# Patient Record
Sex: MALE | Employment: OTHER | URBAN - METROPOLITAN AREA
[De-identification: names, ages, dates, MRNs, and addresses within clinical notes are randomized per-mention and may not be internally consistent; named-entity substitution may affect disease eponyms.]

---

## 2017-08-28 ENCOUNTER — FOLLOW UP (OUTPATIENT)
Dept: URBAN - METROPOLITAN AREA CLINIC 11 | Facility: CLINIC | Age: 72
End: 2017-08-28

## 2017-08-28 DIAGNOSIS — H35.413: ICD-10-CM

## 2017-08-28 PROCEDURE — 1036F TOBACCO NON-USER: CPT

## 2017-08-28 PROCEDURE — 92226 OPHTHALMOSCOPY (SUB): CPT

## 2017-08-28 PROCEDURE — G8482 FLU IMMUNIZE ORDER/ADMIN: HCPCS

## 2017-08-28 PROCEDURE — G8427 DOCREV CUR MEDS BY ELIG CLIN: HCPCS

## 2017-08-28 PROCEDURE — 4040F PNEUMOC VAC/ADMIN/RCVD: CPT

## 2017-08-28 PROCEDURE — 92014 COMPRE OPH EXAM EST PT 1/>: CPT

## 2017-08-28 ASSESSMENT — TONOMETRY
OS_IOP_MMHG: 13
OD_IOP_MMHG: 10

## 2017-08-28 ASSESSMENT — VISUAL ACUITY
OS_SC: 20/30+2
OD_SC: 20/30-2

## 2018-04-10 ENCOUNTER — OFFICE VISIT (OUTPATIENT)
Dept: CARDIOLOGY | Facility: CLINIC | Age: 73
End: 2018-04-10
Attending: INTERNAL MEDICINE
Payer: MEDICARE

## 2018-04-10 VITALS
SYSTOLIC BLOOD PRESSURE: 122 MMHG | BODY MASS INDEX: 27.87 KG/M2 | WEIGHT: 236 LBS | HEART RATE: 56 BPM | HEIGHT: 77 IN | DIASTOLIC BLOOD PRESSURE: 80 MMHG | RESPIRATION RATE: 16 BRPM

## 2018-04-10 DIAGNOSIS — I48.91 ATRIAL FIBRILLATION, UNSPECIFIED TYPE (CMS/HCC): Primary | ICD-10-CM

## 2018-04-10 DIAGNOSIS — I49.5 TACHYCARDIA-BRADYCARDIA (CMS/HCC): ICD-10-CM

## 2018-04-10 DIAGNOSIS — I42.9 CARDIOMYOPATHY, UNSPECIFIED TYPE (CMS/HCC): ICD-10-CM

## 2018-04-10 PROBLEM — I10 HYPERTENSION: Status: ACTIVE | Noted: 2018-04-10

## 2018-04-10 PROBLEM — Z82.49 FAMILY HISTORY OF CORONARY ARTERY DISEASE: Status: ACTIVE | Noted: 2018-04-10

## 2018-04-10 PROCEDURE — 93000 ELECTROCARDIOGRAM COMPLETE: CPT | Performed by: INTERNAL MEDICINE

## 2018-04-10 PROCEDURE — 99213 OFFICE O/P EST LOW 20 MIN: CPT | Performed by: INTERNAL MEDICINE

## 2018-04-10 RX ORDER — METOPROLOL SUCCINATE 100 MG/1
100 TABLET, EXTENDED RELEASE ORAL 2 TIMES DAILY
Qty: 180 TABLET | Refills: 2 | Status: SHIPPED | OUTPATIENT
Start: 2018-04-10 | End: 2018-10-09 | Stop reason: SDUPTHER

## 2018-04-10 RX ORDER — BICALUTAMIDE 50 MG/1
50 TABLET, FILM COATED ORAL DAILY
COMMUNITY
Start: 2016-08-23 | End: 2022-07-12 | Stop reason: ALTCHOICE

## 2018-04-10 RX ORDER — MULTIVITAMIN
TABLET ORAL DAILY
COMMUNITY
Start: 2016-08-23

## 2018-04-10 RX ORDER — ENALAPRIL MALEATE 10 MG/1
10 TABLET ORAL 2 TIMES DAILY
COMMUNITY
Start: 2017-10-10 | End: 2018-04-10 | Stop reason: SDUPTHER

## 2018-04-10 RX ORDER — METOPROLOL SUCCINATE 100 MG/1
100 TABLET, EXTENDED RELEASE ORAL 2 TIMES DAILY
COMMUNITY
Start: 2017-10-10 | End: 2018-04-10 | Stop reason: SDUPTHER

## 2018-04-10 RX ORDER — ENALAPRIL MALEATE 10 MG/1
10 TABLET ORAL 2 TIMES DAILY
Qty: 180 TABLET | Refills: 2 | Status: SHIPPED | OUTPATIENT
Start: 2018-04-10 | End: 2019-04-09 | Stop reason: ALTCHOICE

## 2018-04-10 NOTE — PROGRESS NOTES
April 10, 2018      Patient: Delvin Lewis   YOB: 1945   Date of Visit 4/10/2018   [facility]# [id number]   MRN# [id number]       April 10, 2018    Dear Radha,    Delvin Lewis was in our cardiology outpatient office today for his scheduled appointment.  He continues to do quite well.  He is in permanent atrial fibrillation with a mild primary cardiomyopathy, but he has not had heart failure symptoms or anything else to suggest deterioration.  We are up to date with regard to noninvasive cardiac studies and he has been able, as I said, to exercise and maintain a high level of activity.  His medicines include apixaban, Casodex, enalapril, and Toprol.    On examination, blood pressure was 122/80 with a heart rate of 56 beats per minute.  His rhythm was irregular.  Respiratory rate was 18 and he weighed 236 pounds.  His lung fields were clear.  He did not have jugular venous distention or carotid bruits.  The cardiac examination revealed an irregular rhythm with his usual soft systolic murmur at the upper left sternal edge.  He did not have organomegaly or ascites. He did not have any peripheral edema.  His peripheral pulses were full.  He had no neurologic findings.  His electrocardiogram showed atrial fibrillation with an intraventricular conduction delay.    Delvin Lewis is stable from our perspective and so I did not alter his medical program, nor did I order any further laboratory studies.  I provided him with refills for his cardiac medications and asked to return to see me in about 6 months.  In the meantime, he will maintain his usual contact with you and his other physicians. If you have any questions about his cardiac situation, please let me know. Thank you for sharing his care.      Sincerely,      QUINCY BONNER MD        cc:  CC PROVIDER  RADHA AGUIRRE MD    DD: 04/10/2018 10:46  DT: 04/10/2018 11:38  Voice ID: [id number]/Report ID: [id number]  turner

## 2018-09-10 ENCOUNTER — FOLLOW UP (OUTPATIENT)
Dept: URBAN - METROPOLITAN AREA CLINIC 11 | Facility: CLINIC | Age: 73
End: 2018-09-10

## 2018-09-10 DIAGNOSIS — H35.413: ICD-10-CM

## 2018-09-10 DIAGNOSIS — H43.813: ICD-10-CM

## 2018-09-10 PROCEDURE — 92014 COMPRE OPH EXAM EST PT 1/>: CPT

## 2018-09-10 PROCEDURE — 92226 OPHTHALMOSCOPY (SUB): CPT

## 2018-09-10 ASSESSMENT — VISUAL ACUITY
OD_SC: 20/30-1
OS_SC: 20/20-1

## 2018-09-10 ASSESSMENT — TONOMETRY
OD_IOP_MMHG: 11
OS_IOP_MMHG: 12

## 2018-10-09 ENCOUNTER — OFFICE VISIT (OUTPATIENT)
Dept: CARDIOLOGY | Facility: CLINIC | Age: 73
End: 2018-10-09
Payer: MEDICARE

## 2018-10-09 VITALS
HEART RATE: 64 BPM | RESPIRATION RATE: 16 BRPM | WEIGHT: 239 LBS | BODY MASS INDEX: 28.22 KG/M2 | HEIGHT: 77 IN | DIASTOLIC BLOOD PRESSURE: 80 MMHG | SYSTOLIC BLOOD PRESSURE: 124 MMHG

## 2018-10-09 DIAGNOSIS — I48.91 ATRIAL FIBRILLATION, UNSPECIFIED TYPE (CMS/HCC): Primary | ICD-10-CM

## 2018-10-09 DIAGNOSIS — I10 ESSENTIAL HYPERTENSION: ICD-10-CM

## 2018-10-09 DIAGNOSIS — I49.5 TACHYCARDIA-BRADYCARDIA (CMS/HCC): ICD-10-CM

## 2018-10-09 DIAGNOSIS — I42.0 DILATED CARDIOMYOPATHY (CMS/HCC): ICD-10-CM

## 2018-10-09 PROCEDURE — 99213 OFFICE O/P EST LOW 20 MIN: CPT | Performed by: INTERNAL MEDICINE

## 2018-10-09 PROCEDURE — 93000 ELECTROCARDIOGRAM COMPLETE: CPT | Performed by: INTERNAL MEDICINE

## 2018-10-09 RX ORDER — METOPROLOL SUCCINATE 100 MG/1
100 TABLET, EXTENDED RELEASE ORAL 2 TIMES DAILY
Qty: 180 TABLET | Refills: 2 | Status: SHIPPED | OUTPATIENT
Start: 2018-10-09 | End: 2019-04-09 | Stop reason: SDUPTHER

## 2018-10-09 RX ORDER — ENALAPRIL MALEATE 10 MG/1
10 TABLET ORAL 2 TIMES DAILY
COMMUNITY
End: 2018-10-09 | Stop reason: SDUPTHER

## 2018-10-09 RX ORDER — ENALAPRIL MALEATE 10 MG/1
10 TABLET ORAL 2 TIMES DAILY
Qty: 180 TABLET | Refills: 2 | Status: SHIPPED | OUTPATIENT
Start: 2018-10-09 | End: 2019-04-09 | Stop reason: SDUPTHER

## 2018-10-09 NOTE — PROGRESS NOTES
October 11, 2018      Patient: Delvin Lewis   YOB: 1945   Date of Visit 10/9/2018   [facility]# [id number]   MRN# [id number]       October 09, 2018    RANDY DESHPANDE  [address] [facility]  [id number]  [place] [address]    Dear RANDY,    Delvin Lewis was in our cardiology outpatient offices today for a follow-up appointment.  He continues to do quite well.  He is in permanent atrial fibrillation and has a mild cardiomyopathy, but he is well-compensated on his current medical program that consists of APIXABAN, ENALAPRIL, METOPROLOL, and VITAMINS.  He also uses CASODEX.  He told me that he does see you on a regular basis and he is due for his annual physical examination that will include laboratory testing.  He has been able to exercise regularly and has kept himself in his usual good physical condition and was in good spirits.    On examination, blood pressure was 124/80 with a heart rate of 64 beats per minute and his rhythm was irregular.  Respiratory was 16.  He weighed 239 pounds.  His lung fields were clear.  He did not have jugular venous distention or carotid bruits.  Cardiac examination revealed normal heart tones with an irregular rhythm and I did not hear a murmur today.  He did not have organomegaly or ascites on abdominal examination, but he did have about trace to 1+ pitting edema of both lower extremities. Electrocardiogram showed atrial fibrillation with a controlled ventricular response and somewhat peaked T waves that we have seen on prior tracings.    Dr. 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  His next appointment here will be in the spring, but he will call should there be any issues or important changes in his cardiovascular situation. Thanks once again for sharing Dr. Lewis's care with us and for your consideration.    Sincerely,        QUINCY BONNER MD      DD: 10/09/2018 09:33  DT: 10/09/2018 16:56  Voice ID: [id number]/Report ID:  [id number]  [email or web address]

## 2019-04-09 ENCOUNTER — OFFICE VISIT (OUTPATIENT)
Dept: CARDIOLOGY | Facility: CLINIC | Age: 74
End: 2019-04-09
Payer: MEDICARE

## 2019-04-09 VITALS
BODY MASS INDEX: 27.98 KG/M2 | DIASTOLIC BLOOD PRESSURE: 80 MMHG | WEIGHT: 237 LBS | HEIGHT: 77 IN | RESPIRATION RATE: 16 BRPM | SYSTOLIC BLOOD PRESSURE: 122 MMHG | HEART RATE: 70 BPM

## 2019-04-09 DIAGNOSIS — I10 ESSENTIAL HYPERTENSION: ICD-10-CM

## 2019-04-09 DIAGNOSIS — I49.5 TACHYCARDIA-BRADYCARDIA (CMS/HCC): ICD-10-CM

## 2019-04-09 DIAGNOSIS — I48.91 ATRIAL FIBRILLATION, UNSPECIFIED TYPE (CMS/HCC): Primary | ICD-10-CM

## 2019-04-09 DIAGNOSIS — I42.0 DILATED CARDIOMYOPATHY (CMS/HCC): ICD-10-CM

## 2019-04-09 PROCEDURE — 99213 OFFICE O/P EST LOW 20 MIN: CPT | Performed by: INTERNAL MEDICINE

## 2019-04-09 PROCEDURE — 93000 ELECTROCARDIOGRAM COMPLETE: CPT | Performed by: INTERNAL MEDICINE

## 2019-04-09 RX ORDER — ENALAPRIL MALEATE 10 MG/1
10 TABLET ORAL 2 TIMES DAILY
Qty: 180 TABLET | Refills: 3 | Status: SHIPPED | OUTPATIENT
Start: 2019-04-09 | End: 2019-10-09 | Stop reason: SDUPTHER

## 2019-04-09 RX ORDER — METOPROLOL SUCCINATE 100 MG/1
100 TABLET, EXTENDED RELEASE ORAL 2 TIMES DAILY
Qty: 180 TABLET | Refills: 3 | Status: SHIPPED | OUTPATIENT
Start: 2019-04-09 | End: 2019-10-09 | Stop reason: SDUPTHER

## 2019-04-09 NOTE — PROGRESS NOTES
April 13, 2019      Patient: Delvin Lewis   YOB: 1945   Date of Visit 4/9/2019   [facility]# [id number]   MRN# [id number]       April 09, 2019    JERAMY PADILLA MD  [address] [place] [address]    Dear Dr. PADILLA:    Delvin Lewis was in our cardiology outpatient offices today for a follow-up appointment.  He continues to do well from our perspective.  He has had no symptoms from what is now permanent atrial fibrillation.  He has not had heart failure symptomatology or anything to suggest deterioration in left ventricular function.  He is using APIXABAN, CASODEX, VASOTEC, TOPROL, and VITAMINS.  We are up to date with regard to noninvasive cardiac studies, his last stress echocardiogram having occurred in 2017.  I reviewed some laboratory results that were available through the [facility] system that looked mostly fine, including his blood lipids.  He has been active and exercising on a regular basis and was in his customary good spirits.    On examination, blood pressure was 122/80 with a heart rate of 70 beats per minute.  His rhythm was irregular.  Respiratory rate was 16 and he weighed 237 pounds.  His lung fields were clear.  He did not have jugular venous distention or carotid bruits.  The cardiac examination revealed normal heart tones with an irregular rhythm without any murmurs.  He had no organomegaly or ascites on abdominal examination nor did he have any edema of the lower extremities.  His pulses were palpable. Electrocardiogram showed atrial fibrillation with a controlled ventricular response and no other findings.     Champdaniel is stable and so I did not alter his medical program or order any further laboratory studies.  I provided him with refills for his cardiac drugs and asked him to return to our offices in about 6 months.  If you require assistance or have any questions about his cardiovascular management, in the meantime, please let me know.  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 Thank you for your consideration and for sharing Dr. Lewis's care these last several years.      Sincerely,      QUINCY BONNER MD        DD: 04/09/2019 10:07  DT: 04/09/2019 10:25  Voice ID: [id number]/Report ID: [id number]  [email or web address]

## 2019-09-09 ENCOUNTER — FOLLOW UP (OUTPATIENT)
Dept: URBAN - METROPOLITAN AREA CLINIC 11 | Facility: CLINIC | Age: 74
End: 2019-09-09

## 2019-09-09 DIAGNOSIS — H35.413: ICD-10-CM

## 2019-09-09 DIAGNOSIS — H43.813: ICD-10-CM

## 2019-09-09 PROCEDURE — 92014 COMPRE OPH EXAM EST PT 1/>: CPT

## 2019-09-09 PROCEDURE — 92226 OPHTHALMOSCOPY (SUB): CPT

## 2019-09-09 ASSESSMENT — VISUAL ACUITY
OS_SC: 20/20-1
OD_SC: 20/25-1

## 2019-09-09 ASSESSMENT — TONOMETRY
OD_IOP_MMHG: 15
OS_IOP_MMHG: 13

## 2019-10-09 ENCOUNTER — OFFICE VISIT (OUTPATIENT)
Dept: CARDIOLOGY | Facility: CLINIC | Age: 74
End: 2019-10-09
Payer: MEDICARE

## 2019-10-09 VITALS
BODY MASS INDEX: 27.87 KG/M2 | SYSTOLIC BLOOD PRESSURE: 122 MMHG | WEIGHT: 236 LBS | HEIGHT: 77 IN | HEART RATE: 70 BPM | RESPIRATION RATE: 16 BRPM | DIASTOLIC BLOOD PRESSURE: 76 MMHG

## 2019-10-09 DIAGNOSIS — I42.0 DILATED CARDIOMYOPATHY (CMS/HCC): ICD-10-CM

## 2019-10-09 DIAGNOSIS — I49.5 TACHYCARDIA-BRADYCARDIA (CMS/HCC): ICD-10-CM

## 2019-10-09 DIAGNOSIS — I48.91 ATRIAL FIBRILLATION, UNSPECIFIED TYPE (CMS/HCC): Primary | ICD-10-CM

## 2019-10-09 DIAGNOSIS — I10 ESSENTIAL HYPERTENSION: ICD-10-CM

## 2019-10-09 PROCEDURE — 99213 OFFICE O/P EST LOW 20 MIN: CPT | Performed by: INTERNAL MEDICINE

## 2019-10-09 PROCEDURE — 93000 ELECTROCARDIOGRAM COMPLETE: CPT | Performed by: INTERNAL MEDICINE

## 2019-10-09 RX ORDER — CALCIUM CARB/VITAMIN D3/VIT K1 500-500-40
TABLET,CHEWABLE ORAL DAILY
COMMUNITY

## 2019-10-09 RX ORDER — ENALAPRIL MALEATE 10 MG/1
10 TABLET ORAL 2 TIMES DAILY
Qty: 180 TABLET | Refills: 3 | Status: SHIPPED | OUTPATIENT
Start: 2019-10-09 | End: 2020-09-29 | Stop reason: SDUPTHER

## 2019-10-09 RX ORDER — METOPROLOL SUCCINATE 100 MG/1
100 TABLET, EXTENDED RELEASE ORAL 2 TIMES DAILY
Qty: 180 TABLET | Refills: 3 | Status: SHIPPED | OUTPATIENT
Start: 2019-10-09 | End: 2020-09-29 | Stop reason: SDUPTHER

## 2019-10-09 RX ORDER — TADALAFIL 20 MG/1
TABLET ORAL DAILY PRN
COMMUNITY
Start: 2018-10-22

## 2019-10-09 RX ORDER — IPRATROPIUM BROMIDE 21 UG/1
SPRAY, METERED NASAL DAILY PRN
COMMUNITY
Start: 2019-09-20 | End: 2023-06-20

## 2019-10-09 NOTE — PROGRESS NOTES
October 11, 2019      Patient: Delvin Lewis   YOB: 1945   Date of Visit 10/9/2019   [facility]# [id number]   MRN# [id number]       October 09, 2019    CARLOS JORGENSEN MD  [address] [place] [phone]    Dear DR. JORGENSEN:    I understand you are now providing primary care for Delvin Lewis.  We have been seeing Dr. Lewis for several years with a diagnosis of permanent atrial fibrillation.  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  His most recent set of noninvasive cardiac studies revealed normal or nearly normal left ventricular function with no evidence of ischemia or other structural disease of consequence.  His medicines have remained fairly constant and include apixaban, Casodex, enalapril, Atrovent, metoprolol, and an occasional Cialis.  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  He has been traveling extensively and enjoying himself and reported no new cardiovascular symptoms.  His last set of noninvasive cardiac studies was in 2017.    On examination today, blood pressure was 122/76 with a heart rate of 70 beats per minute.  His rhythm was irregular.  Respiratory rate was 16.  He weighed 236 pounds, about the same as last time.  His lung fields were clear.  He did not have jugular venous distention or carotid bruits.  The cardiac examination revealed a nondisplaced point of maximal impulse with normal heart tones that were somewhat distant but he had an irregular rhythm and no murmurs.  He had a normal abdominal examination without organomegaly and I could not palpate his abdominal aorta.  His pulses were full bilaterally and he had no edema.  Electrocardiogram showed atrial fibrillation with a controlled ventricular response with his customary pattern of  accentuated T waves in the precordial leads.    Dr. Lewis is stable from our perspective and so I did not alter his medical program and refilled his cardiac drugs.  His next appointment here will be in 6 months, but he will maintain contact with you and that should include regular   laboratory testing as you see fit.  If any of those results are germane to his cardiovascular follow-up, please send along copies.  Dr. Jordan will have another set of noninvasive cardiac studies after his next visit with me in the spring.  In the meantime, if you have any questions about his cardiac situation or treatment at any time, please let me know.    Dr. Lewis is one of our nicest patients and I look forward to sharing his care with you him.    Sincerely,        QUINCY BONNER MD    DD: 10/09/2019 09:22  DT: 10/09/2019 13:05  Voice ID: [id number]/Report ID: [id number]  jose [email or web address]

## 2020-07-08 ENCOUNTER — TELEPHONE (OUTPATIENT)
Dept: CARDIOLOGY | Facility: CLINIC | Age: 75
End: 2020-07-08

## 2020-07-08 NOTE — TELEPHONE ENCOUNTER
Left message for patients consent to switch 8/11 appointment with Dr Calvillo to a Telemed or MyChart video appointment.    If patient chooses My Chart Video, they must have access to the patient portal and zoom. Patient will log into portal at least 7 days prior to appointment to “Echeck-in.” Patient will not be able to access the appointment the day they are scheduled without completing the echeck-in first.

## 2020-08-19 ENCOUNTER — TELEPHONE (OUTPATIENT)
Dept: CARDIOLOGY | Facility: CLINIC | Age: 75
End: 2020-08-19

## 2020-08-19 NOTE — TELEPHONE ENCOUNTER
Left message for patient in regards to appointment with Dr. Calvillo on 9/23. Please ask patient if they would prefer an in office visit, or a telemed/mychart video appointment.     If patient chooses in office they will have to be switched to a Tuesday at the [place] office, Dr. Calvillo will not be coming to [facility]. If patient chooses telemed/mychart video appointment, please put on a Wednesday schedule at [facility].

## 2020-09-21 ENCOUNTER — 1 YEAR FOLLOW-UP (OUTPATIENT)
Dept: URBAN - METROPOLITAN AREA CLINIC 11 | Facility: CLINIC | Age: 75
End: 2020-09-21

## 2020-09-21 DIAGNOSIS — Z96.1: ICD-10-CM

## 2020-09-21 DIAGNOSIS — H43.813: ICD-10-CM

## 2020-09-21 DIAGNOSIS — H35.413: ICD-10-CM

## 2020-09-21 PROCEDURE — 92014 COMPRE OPH EXAM EST PT 1/>: CPT

## 2020-09-21 ASSESSMENT — VISUAL ACUITY
OS_SC: 20/20
OD_SC: 20/20-2

## 2020-09-21 ASSESSMENT — TONOMETRY
OS_IOP_MMHG: 13
OD_IOP_MMHG: 13

## 2020-09-29 ENCOUNTER — OFFICE VISIT (OUTPATIENT)
Dept: CARDIOLOGY | Facility: CLINIC | Age: 75
End: 2020-09-29
Payer: MEDICARE

## 2020-09-29 VITALS
SYSTOLIC BLOOD PRESSURE: 162 MMHG | DIASTOLIC BLOOD PRESSURE: 90 MMHG | WEIGHT: 240 LBS | HEIGHT: 77 IN | HEART RATE: 70 BPM | BODY MASS INDEX: 28.34 KG/M2 | OXYGEN SATURATION: 96 %

## 2020-09-29 DIAGNOSIS — I10 ESSENTIAL HYPERTENSION: ICD-10-CM

## 2020-09-29 DIAGNOSIS — I48.91 ATRIAL FIBRILLATION, UNSPECIFIED TYPE (CMS/HCC): Primary | ICD-10-CM

## 2020-09-29 DIAGNOSIS — I49.5 TACHYCARDIA-BRADYCARDIA (CMS/HCC): ICD-10-CM

## 2020-09-29 DIAGNOSIS — I42.0 DILATED CARDIOMYOPATHY (CMS/HCC): ICD-10-CM

## 2020-09-29 PROCEDURE — 93000 ELECTROCARDIOGRAM COMPLETE: CPT | Performed by: INTERNAL MEDICINE

## 2020-09-29 PROCEDURE — 99213 OFFICE O/P EST LOW 20 MIN: CPT | Performed by: INTERNAL MEDICINE

## 2020-09-29 RX ORDER — METOPROLOL SUCCINATE 100 MG/1
100 TABLET, EXTENDED RELEASE ORAL 2 TIMES DAILY
Qty: 180 TABLET | Refills: 3 | Status: SHIPPED | OUTPATIENT
Start: 2020-09-29 | End: 2021-07-13 | Stop reason: SDUPTHER

## 2020-09-29 RX ORDER — ENALAPRIL MALEATE 10 MG/1
10 TABLET ORAL 2 TIMES DAILY
Qty: 180 TABLET | Refills: 3 | Status: SHIPPED | OUTPATIENT
Start: 2020-09-29 | End: 2021-07-13 | Stop reason: SDUPTHER

## 2020-09-29 NOTE — PROGRESS NOTES
October 2, 2020      Patient: Delvin Lewis   YOB: 1945   Date of Visit 9/29/2020   [facility]# [id number]   MRN# [id number]       September 29, 2020    CARLOS BAKER MD  [address] [place] [phone]    Dear Dr. Baker:    I understand your group is providing primary care for Delvin Lewis.  We have been seeing Dr. Lewis for several years with the diagnosis of chronic and permanent atrial fibrillation.  He had presented with a dilated cardiomyopathy, but with rate control and anticoagulation, he has recovered left ventricular function and has done quite well.  He is on a conservative medical program that includes METOPROLOL, ENALAPRIL, and APIXABAN.  He was diagnosed several years ago with prostate cancer and is on CASODEX and also uses an occasional CIALIS.  His most recent health problems have been skin cancers, but they have been treated successfully.  His last noninvasive cardiac testing was carried out in 2017, at which time he had no evidence of significant left ventricular dysfunction or structural disease or stress-induced myocardial ischemia.  Dr. Lewis has remained sheltered during the crisis, but has kept himself in reasonably good physical condition and was in his usual good spirits.    On examination, blood pressure was elevated at 150/80, which is distinctly high for Dr. Lewis.  Heart rate was 70 beats per minute.  His rhythm was irregular.  Respiratory rate was 16, and he weighed 240 pounds.  His lung fields were clear.  He did not have jugular venous distention or carotid bruits.  Cardiac examination revealed a quiet precordium with a normal percussion note and normal heart tones with an irregular rhythm.  He had no abdominal findings nor did he have any peripheral edema.  His electrocardiogram showed atrial fibrillation with a controlled ventricular response and no other findings.    Dr. Lewis then is stable from our perspective and so I did not alter his  medicines, and in fact, gave him some refills for his cardiac drugs.  I have scheduled him for a return appointment in about 9 months, but I also encouraged him to call should there be any important change in his clinical situation.  When he returns in 9 months, he will have an exercise echocardiogram for surveillance, and I will make sure that you receive a copy of the report and any updates.  In the meantime, Dr. Lewis will pursue his usual follow-up with you and his other physicians. I would appreciate it if you could keep an eye on his blood pressure for us. If you have any questions about Dr. Lewis's cardiovascular situation, please let me know.    Delvin Lewis is one of our nicest patients, and I look forward to sharing his care with you.    Best regards.      QUINCY BONNER MD      DD: 09/29/2020 13:22  DT: 09/29/2020 13:47  Voice ID: [id number]/Report ID: [id number]  [facility]

## 2021-07-13 ENCOUNTER — OFFICE VISIT (OUTPATIENT)
Dept: CARDIOLOGY | Facility: CLINIC | Age: 76
End: 2021-07-13
Payer: MEDICARE

## 2021-07-13 ENCOUNTER — HOSPITAL ENCOUNTER (OUTPATIENT)
Dept: CARDIOLOGY | Facility: CLINIC | Age: 76
Discharge: HOME | End: 2021-07-13
Attending: INTERNAL MEDICINE
Payer: MEDICARE

## 2021-07-13 VITALS
WEIGHT: 240 LBS | SYSTOLIC BLOOD PRESSURE: 162 MMHG | HEIGHT: 77 IN | DIASTOLIC BLOOD PRESSURE: 90 MMHG | BODY MASS INDEX: 28.34 KG/M2

## 2021-07-13 VITALS
BODY MASS INDEX: 27.2 KG/M2 | WEIGHT: 230.4 LBS | SYSTOLIC BLOOD PRESSURE: 126 MMHG | OXYGEN SATURATION: 94 % | HEIGHT: 77 IN | DIASTOLIC BLOOD PRESSURE: 60 MMHG | HEART RATE: 66 BPM

## 2021-07-13 DIAGNOSIS — I42.0 DILATED CARDIOMYOPATHY (CMS/HCC): ICD-10-CM

## 2021-07-13 DIAGNOSIS — I48.21 PERMANENT ATRIAL FIBRILLATION (CMS/HCC): Primary | ICD-10-CM

## 2021-07-13 DIAGNOSIS — I10 ESSENTIAL HYPERTENSION: ICD-10-CM

## 2021-07-13 DIAGNOSIS — I48.91 ATRIAL FIBRILLATION, UNSPECIFIED TYPE (CMS/HCC): ICD-10-CM

## 2021-07-13 LAB
AORTIC ROOT ANNULUS: 3.7 CM
ASCENDING AORTA: 3.6 CM
AV PEAK GRADIENT: 9 MMHG
AV PEAK VELOCITY-S: 1.46 M/S
BSA FOR ECHO PROCEDURE: 2.43 M2
E WAVE DECELERATION TIME: 225 MS
E/A RATIO: 2.2
E/E' RATIO: 10.1
E/LAT E' RATIO: 4.8
EDV (BP): 69 CM3
EF (A4C): 56.8 %
EF A2C: 63.2 %
EJECTION FRACTION: 61 %
ESV (BP): 26.9 CM3
LA ESV (BP): 104 CM3
LA ESV INDEX (A2C): 43.21 CM3/M2
LA ESV INDEX (BP): 42.8 CM3/M2
LA/AORTA RATIO: 1.78
LAAS-AP2: 31.1 CM2
LAAS-AP4: 28.2 CM2
LAD 2D: 6.6 CM
LALD A4C: 6.75 CM
LALD A4C: 7.39 CM
LAV-S: 105 CM3
LEFT ATRIUM VOLUME INDEX: 39.01 CM3/M2
LEFT ATRIUM VOLUME: 94.8 CM3
LEFT VENTRICLE DIASTOLIC VOLUME INDEX: 33.79 CM3/M2
LEFT VENTRICLE DIASTOLIC VOLUME: 82.1 CM3
LEFT VENTRICLE SYSTOLIC VOLUME INDEX: 14.57 CM3/M2
LEFT VENTRICLE SYSTOLIC VOLUME: 35.4 CM3
LV DIASTOLIC VOLUME: 56.1 CM3
LV ESV (APICAL 2 CHAMBER): 20.7 CM3
LVAD-AP2: 22.1 CM2
LVAD-AP4: 27.7 CM2
LVAS-AP2: 12.1 CM2
LVAS-AP4: 15.9 CM2
LVEDVI(A2C): 23.09 CM3/M2
LVEDVI(BP): 28.4 CM3/M2
LVESVI(A2C): 8.52 CM3/M2
LVESVI(BP): 11.07 CM3/M2
LVLD-AP2: 7.24 CM
LVLD-AP4: 7.6 CM
LVLS-AP2: 6.02 CM
LVLS-AP4: 6.01 CM
LVOT PEAK VELOCITY: 1 M/S
LVOT PG: 4 MMHG
MV E'TISSUE VEL-LAT: 0.17 M/S
MV E'TISSUE VEL-MED: 0.08 M/S
MV PEAK A VEL: 0.36 M/S
MV PEAK E VEL: 0.81 M/S
PV PEAK GRADIENT: 12 MMHG
PV PV: 1.75 M/S
RVOT VMAX: 0.69 M/S
STRESS ANGINA INDEX: 0
STRESS BASELINE BP: NORMAL MMHG
STRESS BASELINE HR: 68 BPM
STRESS O2 SAT REST: 94 %
STRESS PERCENT HR: 94 %
STRESS POST ESTIMATED WORKLOAD: 9 METS
STRESS POST EXERCISE DUR MIN: 6 MIN
STRESS POST EXERCISE DUR SEC: 52 SEC
STRESS POST PEAK BP: NORMAL MMHG
STRESS POST PEAK HR: 137 BPM
STRESS TARGET HR: 124 BPM
TR MAX PG: 24 MMHG
TRICUSPID VALVE PEAK REGURGITATION VELOCITY: 2.44 M/S
TV REST PULMONARY ARTERY PRESSURE: 30 MMHG

## 2021-07-13 PROCEDURE — 93350 STRESS TTE ONLY: CPT | Performed by: INTERNAL MEDICINE

## 2021-07-13 PROCEDURE — 99213 OFFICE O/P EST LOW 20 MIN: CPT | Performed by: INTERNAL MEDICINE

## 2021-07-13 PROCEDURE — G8754 DIAS BP LESS 90: HCPCS | Performed by: INTERNAL MEDICINE

## 2021-07-13 PROCEDURE — G8752 SYS BP LESS 140: HCPCS | Performed by: INTERNAL MEDICINE

## 2021-07-13 RX ORDER — ENALAPRIL MALEATE 10 MG/1
10 TABLET ORAL 2 TIMES DAILY
Qty: 180 TABLET | Refills: 3 | Status: SHIPPED | OUTPATIENT
Start: 2021-07-13 | End: 2022-01-11 | Stop reason: SDUPTHER

## 2021-07-13 RX ORDER — METOPROLOL SUCCINATE 100 MG/1
100 TABLET, EXTENDED RELEASE ORAL 2 TIMES DAILY
Qty: 180 TABLET | Refills: 3 | Status: SHIPPED | OUTPATIENT
Start: 2021-07-13 | End: 2022-01-11 | Stop reason: SDUPTHER

## 2021-07-13 ASSESSMENT — CHADS2 SCORE
DIABETES: NO
CHADS2 SCORE: 1
AGE: 65-74 (+1PT.)
VASCULAR DISEASE: NO
HYPERTENSION: NO
CHF: NO
PRIOR STROKE OR TIA OR THROMBOEMBOLISM: NO
SEX: MALE

## 2021-07-13 NOTE — PROGRESS NOTES
July 16, 2021      Patient: Delvin Lewis   YOB: 1945   Date of Visit 7/13/2021   [facility]# [id number]   MRN# [id number]       REPORT TYPE: Office Letter    DATE OF SERVICE: 07/13/2021    Reji Baker MD    Dear Dr. Baker:    Delvin Lewis visited our cardiology outpatient offices today for a follow-up appointment.  He also had an exercise echocardiogram.  I am pleased that he continues to do well.  He is exercising on a regular basis and has kept himself in good physical condition.  His medicines have not changed and include APIXABAN for stroke prophylaxis, CASODEX, ENALAPRIL, ATROVENT, METOPROLOL, and an occasional CIALIS.  He had an exercise echocardiogram today that was acceptable in all respects.  He had normal left ventricular function at rest without any evidence of significant structural disease, and had a good exercise effort with a physiologic blood pressure and heart rate response.  He did not have any evidence of myocardial ischemia or worsened arrhythmias.  He told me that he does see you on a regular basis for routine laboratory studies, and I know he has been adherent to his medical program.  There were no other cardiac issues.    On physical examination, blood pressure was 126/60 with a heart rate of 66 beats per minute.  His rhythm was irregular.  Respiratory rate was 18, and he weighed 230 pounds.  His cardiopulmonary examination was unremarkable and unchanged.  He had clear lungs, an irregular rhythm on auscultation, but no murmurs or gallops.  He had no abdominal findings nor did he have any signs of volume overload or peripheral vascular compromise.  His electrocardiogram showed atrial fibrillation as usual.    Dr. Lewis, then, is stable from our perspective, and so I did not alter his medical program and provided him with refills for his cardiac drugs.  He will return to our offices in approximately 6 months, but we will be available to answer questions or to address  issues in the interim.    I spent approximately 30 minutes with Dr. Lewsi's case this morning, taking a history, performing a physical examination, interpreting his electrocardiogram, reviewing the results of his exercise echocardiogram, going through his medication list, renewing his drugs, answering questions, making arrangements for a follow-up appointment, and documenting all of the above.    Thank you for your consideration and for sharing Dr. Lewis's care with us.    Best regards,      Luis Felipe Calvillo MD    DD: 07/13/2021 10:17  DT: 07/13/2021 10:20  Voice ID: [id number]/Report ID: [id number]  ns

## 2021-10-25 ENCOUNTER — 1 YEAR FOLLOW-UP (OUTPATIENT)
Dept: URBAN - METROPOLITAN AREA CLINIC 11 | Facility: CLINIC | Age: 76
End: 2021-10-25

## 2021-10-25 DIAGNOSIS — H35.413: ICD-10-CM

## 2021-10-25 DIAGNOSIS — H43.813: ICD-10-CM

## 2021-10-25 DIAGNOSIS — Z96.1: ICD-10-CM

## 2021-10-25 DIAGNOSIS — H26.40: ICD-10-CM

## 2021-10-25 PROCEDURE — 92014 COMPRE OPH EXAM EST PT 1/>: CPT

## 2021-10-25 PROCEDURE — 92201 OPSCPY EXTND RTA DRAW UNI/BI: CPT

## 2021-10-25 ASSESSMENT — TONOMETRY
OD_IOP_MMHG: 13
OS_IOP_MMHG: 12

## 2021-10-25 ASSESSMENT — VISUAL ACUITY
OS_SC: 20/20
OD_SC: 20/25+2

## 2022-01-11 ENCOUNTER — OFFICE VISIT (OUTPATIENT)
Dept: CARDIOLOGY | Facility: CLINIC | Age: 77
End: 2022-01-11
Payer: MEDICARE

## 2022-01-11 VITALS
OXYGEN SATURATION: 98 % | SYSTOLIC BLOOD PRESSURE: 146 MMHG | TEMPERATURE: 98.7 F | WEIGHT: 259.2 LBS | HEART RATE: 78 BPM | DIASTOLIC BLOOD PRESSURE: 94 MMHG | BODY MASS INDEX: 30.6 KG/M2 | RESPIRATION RATE: 14 BRPM | HEIGHT: 77 IN

## 2022-01-11 DIAGNOSIS — I10 PRIMARY HYPERTENSION: ICD-10-CM

## 2022-01-11 DIAGNOSIS — I49.5 TACHYCARDIA-BRADYCARDIA (CMS/HCC): ICD-10-CM

## 2022-01-11 DIAGNOSIS — I42.0 DILATED CARDIOMYOPATHY (CMS/HCC): ICD-10-CM

## 2022-01-11 DIAGNOSIS — I48.91 ATRIAL FIBRILLATION, UNSPECIFIED TYPE (CMS/HCC): Primary | ICD-10-CM

## 2022-01-11 PROBLEM — C44.300 SKIN CANCER OF FACE: Status: ACTIVE | Noted: 2017-09-01

## 2022-01-11 PROCEDURE — 93000 ELECTROCARDIOGRAM COMPLETE: CPT | Performed by: INTERNAL MEDICINE

## 2022-01-11 PROCEDURE — G8753 SYS BP > OR = 140: HCPCS | Performed by: INTERNAL MEDICINE

## 2022-01-11 PROCEDURE — G8755 DIAS BP > OR = 90: HCPCS | Performed by: INTERNAL MEDICINE

## 2022-01-11 PROCEDURE — 99213 OFFICE O/P EST LOW 20 MIN: CPT | Performed by: INTERNAL MEDICINE

## 2022-01-11 RX ORDER — ENALAPRIL MALEATE 10 MG/1
10 TABLET ORAL 2 TIMES DAILY
Qty: 180 TABLET | Refills: 3 | Status: SHIPPED | OUTPATIENT
Start: 2022-01-11 | End: 2022-07-12 | Stop reason: SDUPTHER

## 2022-01-11 RX ORDER — METOPROLOL SUCCINATE 100 MG/1
100 TABLET, EXTENDED RELEASE ORAL 2 TIMES DAILY
Qty: 180 TABLET | Refills: 3 | Status: SHIPPED | OUTPATIENT
Start: 2022-01-11 | End: 2022-07-12 | Stop reason: SDUPTHER

## 2022-01-11 RX ORDER — IMIQUIMOD 12.5 MG/.25G
CREAM TOPICAL
COMMUNITY
Start: 2021-03-26 | End: 2023-12-14

## 2022-01-11 NOTE — PROGRESS NOTES
January 15, 2022      Patient: Delvin Lewis   YOB: 1945   Date of Visit 1/11/2022   [facility]# [id number]   MRN# [id number]       REPORT TYPE: Office Letter    DATE OF SERVICE: 01/11/2022    Reji Baker MD    Dear Dr. Baker:    Delvin Lewis was in our cardiology outpatient offices today for a scheduled appointment.  He continues to do very well from our perspective.  He has had symptoms to suggest heart failure, ischemia, or arrhythmia.  We are up to date with regard to noninvasive cardiac studies that have not revealed evidence of significant structural disease.  His medicines have remained essentially the same, and include ALLOPURINOL, APIXABAN, and METOPROLOL, and we gave him renewals for all of those medicines today. He has been able to carry out all of his normal activities, swimming on a regular basis and has kept himself in his usual good physical condition and he was in good spirits. He told me that he does see you on schedule for laboratory studies and general medicine assessments.    On physical examination, blood pressure was a bit elevated at 146/94 with a heart rate of 78 beats per minute.  His rhythm was slightly irregular.  Respiratory rate was 14 and he weighed 259 pounds.  His cardiopulmonary examination was remarkable for a slightly irregular rhythm, but normal heart tones, clear lung fields, no signs of volume overload, and his peripheral vasculature was intact. His electrocardiogram showed atrial fibrillation with a controlled ventricular response.    Dr. Lewis is stable from our perspective and so I did not change his medicines or order any further laboratory studies.  His blood pressure was a little elevated today and he will monitor it at home and will discuss office blood pressure measurements with you as well.  If all is well, his next visit with me will be in 6 months, but we will be available to see him sooner or to address issues in the interim.    I spent  approximately 25 minutes with Dr. Lewis's case this morning, taking a history, performing a physical examination, interpreting his electrocardiogram, reviewing his diagnostic studies, going through his medication list and renewing his drugs, answering questions, making arrangements for follow-up and documenting all of the above.    I am pleased that Dr. Lewis is stable on a conservative medical program and that he has had a good symptom-free period with rate control and anticoagulation. Thank you for sharing his care with us.    Sincerely yours,      Luis Felipe Calvillo MD    DD: 01/11/2022 09:55  DT: 01/11/2022 09:59  Voice ID: [id number]/Report ID: [id number]  bs

## 2022-07-12 ENCOUNTER — OFFICE VISIT (OUTPATIENT)
Dept: CARDIOLOGY | Facility: CLINIC | Age: 77
End: 2022-07-12
Payer: MEDICARE

## 2022-07-12 VITALS
OXYGEN SATURATION: 98 % | DIASTOLIC BLOOD PRESSURE: 92 MMHG | BODY MASS INDEX: 27.51 KG/M2 | WEIGHT: 233 LBS | HEIGHT: 77 IN | RESPIRATION RATE: 18 BRPM | HEART RATE: 72 BPM | SYSTOLIC BLOOD PRESSURE: 164 MMHG

## 2022-07-12 DIAGNOSIS — I49.5 TACHYCARDIA-BRADYCARDIA (CMS/HCC): ICD-10-CM

## 2022-07-12 DIAGNOSIS — I42.0 DILATED CARDIOMYOPATHY (CMS/HCC): ICD-10-CM

## 2022-07-12 DIAGNOSIS — I48.91 ATRIAL FIBRILLATION, UNSPECIFIED TYPE (CMS/HCC): Primary | ICD-10-CM

## 2022-07-12 DIAGNOSIS — I10 PRIMARY HYPERTENSION: ICD-10-CM

## 2022-07-12 PROCEDURE — G8753 SYS BP > OR = 140: HCPCS | Performed by: INTERNAL MEDICINE

## 2022-07-12 PROCEDURE — 99213 OFFICE O/P EST LOW 20 MIN: CPT | Performed by: INTERNAL MEDICINE

## 2022-07-12 PROCEDURE — G8755 DIAS BP > OR = 90: HCPCS | Performed by: INTERNAL MEDICINE

## 2022-07-12 PROCEDURE — 93000 ELECTROCARDIOGRAM COMPLETE: CPT | Performed by: INTERNAL MEDICINE

## 2022-07-12 RX ORDER — ABIRATERONE 500 MG/1
2 TABLET ORAL
COMMUNITY
Start: 2022-04-18

## 2022-07-12 RX ORDER — ENALAPRIL MALEATE 10 MG/1
10 TABLET ORAL 2 TIMES DAILY
Qty: 180 TABLET | Refills: 3 | Status: SHIPPED | OUTPATIENT
Start: 2022-07-12 | End: 2023-01-10 | Stop reason: SDUPTHER

## 2022-07-12 RX ORDER — PREDNISONE 5 MG/1
5 TABLET ORAL DAILY
COMMUNITY
Start: 2022-04-14 | End: 2023-12-14

## 2022-07-12 RX ORDER — METOPROLOL SUCCINATE 100 MG/1
100 TABLET, EXTENDED RELEASE ORAL 2 TIMES DAILY
Qty: 180 TABLET | Refills: 3 | Status: SHIPPED | OUTPATIENT
Start: 2022-07-12 | End: 2023-01-10 | Stop reason: SDUPTHER

## 2022-07-13 NOTE — PROGRESS NOTES
July 15, 2022      Patient: Delvin Lewis   YOB: 1945   Date of Visit 7/12/2022   [facility]# [id number]   MRN# [id number]       REPORT TYPE: Office Letter    DATE OF SERVICE: 07/12/2022    Reji Baker MD    Dear Dr. Baker:    Delvin Lewis was in our cardiology outpatient offices today for a scheduled appointment.  He continues to do well from the cardiovascular perspective.  He has had no symptoms from what is now permanent atrial fibrillation.  He is undergoing treatment for prostate cancer without sequelae.  He has been able to maintain a good level of activity and was in his customary good spirits.  His blood pressure was a bit elevated today and he may be purchasing a home monitoring device and keeping you apprised of his values.  He is using TADALAFIL, PREDNISONE, MULTIVITAMINS, METOPROLOL, ENALAPRIL, APIXABAN, and ZYTIGA.  We are up to date with regard to noninvasive cardiac studies and there were no other issues.  He denied shortness of breath, palpitations, syncope, presyncope, orthopnea, or pedal edema.    On physical examination, blood pressure was 160/90 with a heart rate of 72 beats per minute.  His rhythm was irregular.  Respiratory rate was 18 and he weighed 233 pounds.  His cardiopulmonary examination was unremarkable.  He had an irregular rhythm, but no murmurs or gallops and his lung fields were clear.  He had no signs of volume overload, including jugular venous distention or peripheral edema.  His pulses are easily palpable. Electrocardiogram showed atrial fibrillation with a controlled ventricular response and poor R-wave progression that we have observed previously.    Dr. Lewis is stable from our perspective and so I did not change his medicines or order any further diagnostic studies for the time being.  He will be back to see us in about 6 months and we will be available to see him sooner or to address issues in   the interim.  He will maintain his usual contact  with your office for whatever laboratory testing you deem appropriate, and I would appreciate your keeping an eye on his blood pressure.  I did get a look at some general chemistry values that were drawn for Dr. Lewis in June that were mostly unremarkable.    I spent approximately 30 minutes with Dr. Lewis's case this afternoon taking a history, performing a physical examination, interpreting his electrocardiogram, going through his medication list, reviewing his previous diagnostic and laboratory studies,  making arrangements for follow-up, answering questions, and documenting all of the above.    I am pleased that Dr. Lewis is stable on a conservative medical program. Thank you for sharing his care.    Best regards,      Luis Felipe Calvillo MD    DD: 07/12/2022 13:06  DT: 07/12/2022 22:34  Voice ID: [id number]/Report ID: [id number]  am

## 2022-07-19 ENCOUNTER — UNSCHEDULED FOLLOW UP (OUTPATIENT)
Dept: URBAN - METROPOLITAN AREA CLINIC 11 | Facility: CLINIC | Age: 77
End: 2022-07-19

## 2022-07-19 DIAGNOSIS — H43.813: ICD-10-CM

## 2022-07-19 DIAGNOSIS — H34.8120: ICD-10-CM

## 2022-07-19 DIAGNOSIS — H35.413: ICD-10-CM

## 2022-07-19 PROCEDURE — EYLSP EYLEA SAMPLE

## 2022-07-19 PROCEDURE — 92250 FUNDUS PHOTOGRAPHY W/I&R: CPT

## 2022-07-19 PROCEDURE — 67028 INJECTION EYE DRUG: CPT

## 2022-07-19 PROCEDURE — 92134 CPTRZ OPH DX IMG PST SGM RTA: CPT

## 2022-07-19 PROCEDURE — 92014 COMPRE OPH EXAM EST PT 1/>: CPT | Mod: 25

## 2022-07-19 PROCEDURE — 92235 FLUORESCEIN ANGRPH MLTIFRAME: CPT

## 2022-07-19 ASSESSMENT — TONOMETRY
OD_IOP_MMHG: 13
OS_IOP_MMHG: 13

## 2022-07-19 ASSESSMENT — VISUAL ACUITY
OS_SC: 20/30-1
OD_SC: 20/30+2

## 2022-08-18 ENCOUNTER — FOLLOW UP (OUTPATIENT)
Dept: URBAN - METROPOLITAN AREA CLINIC 11 | Facility: CLINIC | Age: 77
End: 2022-08-18

## 2022-08-18 DIAGNOSIS — H43.813: ICD-10-CM

## 2022-08-18 DIAGNOSIS — H35.413: ICD-10-CM

## 2022-08-18 DIAGNOSIS — H34.8120: ICD-10-CM

## 2022-08-18 PROCEDURE — 92134 CPTRZ OPH DX IMG PST SGM RTA: CPT

## 2022-08-18 PROCEDURE — 92012 INTRM OPH EXAM EST PATIENT: CPT | Mod: 25

## 2022-08-18 PROCEDURE — P EYLEA PFS

## 2022-08-18 PROCEDURE — 67028 INJECTION EYE DRUG: CPT

## 2022-08-18 ASSESSMENT — VISUAL ACUITY: OS_SC: 20/25-1

## 2022-08-18 ASSESSMENT — TONOMETRY: OS_IOP_MMHG: 11

## 2022-10-04 ENCOUNTER — FOLLOW UP (OUTPATIENT)
Dept: URBAN - METROPOLITAN AREA CLINIC 11 | Facility: CLINIC | Age: 77
End: 2022-10-04

## 2022-10-04 DIAGNOSIS — H35.413: ICD-10-CM

## 2022-10-04 DIAGNOSIS — H43.813: ICD-10-CM

## 2022-10-04 DIAGNOSIS — H34.8120: ICD-10-CM

## 2022-10-04 PROCEDURE — P EYLEA PFS

## 2022-10-04 PROCEDURE — 67028 INJECTION EYE DRUG: CPT

## 2022-10-04 PROCEDURE — 92134 CPTRZ OPH DX IMG PST SGM RTA: CPT

## 2022-10-04 PROCEDURE — 92012 INTRM OPH EXAM EST PATIENT: CPT | Mod: 25

## 2022-10-04 ASSESSMENT — TONOMETRY: OS_IOP_MMHG: 13

## 2022-10-04 ASSESSMENT — VISUAL ACUITY: OS_SC: CF 6FT

## 2022-11-01 ENCOUNTER — FOLLOW UP (OUTPATIENT)
Dept: URBAN - METROPOLITAN AREA CLINIC 11 | Facility: CLINIC | Age: 77
End: 2022-11-01

## 2022-11-01 DIAGNOSIS — H35.413: ICD-10-CM

## 2022-11-01 DIAGNOSIS — H43.813: ICD-10-CM

## 2022-11-01 DIAGNOSIS — H34.8120: ICD-10-CM

## 2022-11-01 PROCEDURE — 92012 INTRM OPH EXAM EST PATIENT: CPT | Mod: 25

## 2022-11-01 PROCEDURE — 67028 INJECTION EYE DRUG: CPT

## 2022-11-01 PROCEDURE — 92134 CPTRZ OPH DX IMG PST SGM RTA: CPT

## 2022-11-01 PROCEDURE — P EYLEA PFS

## 2022-11-01 ASSESSMENT — VISUAL ACUITY: OS_SC: 20/100

## 2022-11-01 ASSESSMENT — TONOMETRY: OS_IOP_MMHG: 13

## 2022-12-01 ENCOUNTER — FOLLOW UP (OUTPATIENT)
Dept: URBAN - METROPOLITAN AREA CLINIC 11 | Facility: CLINIC | Age: 77
End: 2022-12-01

## 2022-12-01 DIAGNOSIS — H34.8120: ICD-10-CM

## 2022-12-01 DIAGNOSIS — H35.413: ICD-10-CM

## 2022-12-01 DIAGNOSIS — H43.813: ICD-10-CM

## 2022-12-01 PROCEDURE — P EYLEA PFS

## 2022-12-01 PROCEDURE — 67028 INJECTION EYE DRUG: CPT

## 2022-12-01 PROCEDURE — 92012 INTRM OPH EXAM EST PATIENT: CPT | Mod: 25

## 2022-12-01 PROCEDURE — 92134 CPTRZ OPH DX IMG PST SGM RTA: CPT

## 2022-12-01 ASSESSMENT — TONOMETRY: OS_IOP_MMHG: 15

## 2022-12-01 ASSESSMENT — VISUAL ACUITY: OS_SC: 20/50-1

## 2023-01-10 ENCOUNTER — OFFICE VISIT (OUTPATIENT)
Dept: CARDIOLOGY | Facility: CLINIC | Age: 78
End: 2023-01-10
Payer: MEDICARE

## 2023-01-10 VITALS
HEART RATE: 63 BPM | HEIGHT: 77 IN | WEIGHT: 231 LBS | BODY MASS INDEX: 27.28 KG/M2 | RESPIRATION RATE: 18 BRPM | DIASTOLIC BLOOD PRESSURE: 90 MMHG | OXYGEN SATURATION: 97 % | SYSTOLIC BLOOD PRESSURE: 164 MMHG

## 2023-01-10 DIAGNOSIS — I49.5 TACHYCARDIA-BRADYCARDIA (CMS/HCC): ICD-10-CM

## 2023-01-10 DIAGNOSIS — I48.91 ATRIAL FIBRILLATION, UNSPECIFIED TYPE (CMS/HCC): Primary | ICD-10-CM

## 2023-01-10 DIAGNOSIS — I42.0 DILATED CARDIOMYOPATHY (CMS/HCC): ICD-10-CM

## 2023-01-10 DIAGNOSIS — I10 PRIMARY HYPERTENSION: ICD-10-CM

## 2023-01-10 PROCEDURE — G8755 DIAS BP > OR = 90: HCPCS | Performed by: INTERNAL MEDICINE

## 2023-01-10 PROCEDURE — 99213 OFFICE O/P EST LOW 20 MIN: CPT | Performed by: INTERNAL MEDICINE

## 2023-01-10 PROCEDURE — 93000 ELECTROCARDIOGRAM COMPLETE: CPT | Performed by: INTERNAL MEDICINE

## 2023-01-10 PROCEDURE — G8753 SYS BP > OR = 140: HCPCS | Performed by: INTERNAL MEDICINE

## 2023-01-10 RX ORDER — AMLODIPINE BESYLATE 5 MG/1
5 TABLET ORAL DAILY
Qty: 90 TABLET | Refills: 1 | Status: SHIPPED | OUTPATIENT
Start: 2023-01-10 | End: 2023-06-30

## 2023-01-10 RX ORDER — ENALAPRIL MALEATE 10 MG/1
10 TABLET ORAL 2 TIMES DAILY
Qty: 180 TABLET | Refills: 3 | Status: SHIPPED | OUTPATIENT
Start: 2023-01-10 | End: 2023-06-20 | Stop reason: SDUPTHER

## 2023-01-10 RX ORDER — METOPROLOL SUCCINATE 100 MG/1
100 TABLET, EXTENDED RELEASE ORAL 2 TIMES DAILY
Qty: 180 TABLET | Refills: 3 | Status: SHIPPED | OUTPATIENT
Start: 2023-01-10 | End: 2023-06-20 | Stop reason: SDUPTHER

## 2023-01-10 ASSESSMENT — CHADS2 SCORE
DIABETES: NO
CHF: NO
PRIOR STROKE OR TIA OR THROMBOEMBOLISM: NO
VASCULAR DISEASE: NO
AGE: 75+ (+2 PT.)
SEX: MALE
HYPERTENSION: YES (+1 PT.)
CHADS2 SCORE: 3

## 2023-01-10 NOTE — PROGRESS NOTES
January 14, 2023      Patient: Delvin Lewis   YOB: 1945   Date of Visit 1/10/2023   [facility]# [id number]   MRN# [id number]       REPORT TYPE: Office Letter    DATE OF SERVICE: 01/10/2023    CARLOS BAKER MD    Dear Dr. Baker:    Delvin Lewis was in our cardiology outpatient offices today for a follow-up appointment.  He continues to do well.  He has been monitoring his blood pressure and has it checked at the oncology department at [facility] and it has been distinctly elevated.  This may be a consequence of one of his chemotherapeutic drugs.  [facility] recommended the addition of AMLODIPINE, which is a perfectly good choice for blood pressure control with Dr. Lewis's profile.  I wrote a prescription for AMLODIPINE 5 mg per day.  His other medicines include ZYTIGA, APIXABAN, ENALAPRIL, METOPROLOL, MULTIVITAMINS, PREDNISOLONE and an occasional CIALIS.  We are up to date with regard to noninvasive cardiac studies and Dr. Lewis had no new cardiovascular issues or symptoms.  He has maintained a good level of activity and was in his customary good spirits.    On physical examination, blood pressure was 164/90 with a heart rate of 63 beats per minute.  His rhythm was irregular.  Respiratory rate was 18 and he weighed 231 pounds, which is about his usual.  His lung fields were clear.  He did not have jugular venous distention or carotid bruits.  The cardiac examination revealed an irregular rhythm, but no murmurs or gallops.  He had no signs of volume overload, including peripheral edema.  I could palpate pulses. Electrocardiogram showed atrial fibrillation with a controlled ventricular response and poor R-wave progression in the same pattern that we have seen previously.    Dr. Lewis is stable in permanent atrial fibrillation, and so I did not make any other changes in his program.  He will continue to monitor his blood pressure with the addition of AMLODIPINE.  He will return to our offices in about  6 months for a surveillance visit.  Following that appointment, Dr. Lewis will continue his cardiovascular care with Dr. Ubaldo Pfeiffer, one of my clinical partners at [facility] since I will be withdrawing from clinical practice.  Dr. Lewis understands, however, to call us should there be any important change in his clinical situation between now and the summer or during the transition.    I spent approximately 30 minutes with Dr. Lewis's case this morning, taking a history, performing a physical examination, interpreting his electrocardiogram, going over his medication list in detail and ordering a new drug, refilling some of his other prescriptions, answering a number of questions about transfer of care, making arrangements for a follow-up appointment in June and documenting all of the above.    It has been my pleasure to care for Dr. Lewis and I thank you for sharing his management with us.    Best wishes,      Luis Felipe Calvillo MD    DD: 01/10/2023 10:21  DT: 01/10/2023 10:31  Voice ID: [id number]/Report ID: [id number]  am

## 2023-01-17 ENCOUNTER — FOLLOW UP (OUTPATIENT)
Dept: URBAN - METROPOLITAN AREA CLINIC 11 | Facility: CLINIC | Age: 78
End: 2023-01-17

## 2023-01-17 DIAGNOSIS — H34.8120: ICD-10-CM

## 2023-01-17 DIAGNOSIS — H43.813: ICD-10-CM

## 2023-01-17 DIAGNOSIS — H35.413: ICD-10-CM

## 2023-01-17 PROCEDURE — 92134 CPTRZ OPH DX IMG PST SGM RTA: CPT

## 2023-01-17 PROCEDURE — P EYLEA PFS

## 2023-01-17 PROCEDURE — 92014 COMPRE OPH EXAM EST PT 1/>: CPT | Mod: 25

## 2023-01-17 PROCEDURE — 67028 INJECTION EYE DRUG: CPT

## 2023-01-17 ASSESSMENT — VISUAL ACUITY: OS_SC: 20/200-1

## 2023-01-17 ASSESSMENT — TONOMETRY: OS_IOP_MMHG: 12

## 2023-03-07 ENCOUNTER — FOLLOW UP (OUTPATIENT)
Dept: URBAN - METROPOLITAN AREA CLINIC 11 | Facility: CLINIC | Age: 78
End: 2023-03-07

## 2023-03-07 DIAGNOSIS — H35.413: ICD-10-CM

## 2023-03-07 DIAGNOSIS — H43.813: ICD-10-CM

## 2023-03-07 DIAGNOSIS — H34.8120: ICD-10-CM

## 2023-03-07 PROCEDURE — P EYLEA PFS

## 2023-03-07 PROCEDURE — 92134 CPTRZ OPH DX IMG PST SGM RTA: CPT

## 2023-03-07 PROCEDURE — 67028 INJECTION EYE DRUG: CPT

## 2023-03-07 PROCEDURE — 92014 COMPRE OPH EXAM EST PT 1/>: CPT | Mod: 25

## 2023-03-07 ASSESSMENT — VISUAL ACUITY: OS_SC: CF 4FT

## 2023-03-07 ASSESSMENT — TONOMETRY: OS_IOP_MMHG: 13

## 2023-04-04 ENCOUNTER — FOLLOW UP (OUTPATIENT)
Dept: URBAN - METROPOLITAN AREA CLINIC 11 | Facility: CLINIC | Age: 78
End: 2023-04-04

## 2023-04-04 DIAGNOSIS — H43.813: ICD-10-CM

## 2023-04-04 DIAGNOSIS — H35.413: ICD-10-CM

## 2023-04-04 DIAGNOSIS — H34.8120: ICD-10-CM

## 2023-04-04 PROCEDURE — 92012 INTRM OPH EXAM EST PATIENT: CPT | Mod: 25

## 2023-04-04 PROCEDURE — 92134 CPTRZ OPH DX IMG PST SGM RTA: CPT

## 2023-04-04 PROCEDURE — P EYLEA PFS

## 2023-04-04 PROCEDURE — 67028 INJECTION EYE DRUG: CPT

## 2023-04-04 ASSESSMENT — TONOMETRY: OS_IOP_MMHG: 15

## 2023-04-04 ASSESSMENT — VISUAL ACUITY: OS_SC: 20/100-2

## 2023-05-04 ENCOUNTER — FOLLOW UP (OUTPATIENT)
Dept: URBAN - METROPOLITAN AREA CLINIC 11 | Facility: CLINIC | Age: 78
End: 2023-05-04

## 2023-05-04 DIAGNOSIS — H35.413: ICD-10-CM

## 2023-05-04 DIAGNOSIS — H34.8120: ICD-10-CM

## 2023-05-04 DIAGNOSIS — H43.813: ICD-10-CM

## 2023-05-04 PROCEDURE — P EYLEA PFS

## 2023-05-04 PROCEDURE — 67028 INJECTION EYE DRUG: CPT

## 2023-05-04 PROCEDURE — 92012 INTRM OPH EXAM EST PATIENT: CPT | Mod: 25

## 2023-05-04 PROCEDURE — 92134 CPTRZ OPH DX IMG PST SGM RTA: CPT

## 2023-05-04 ASSESSMENT — VISUAL ACUITY: OS_SC: 20/100+2

## 2023-05-04 ASSESSMENT — TONOMETRY: OS_IOP_MMHG: 13

## 2023-05-15 ENCOUNTER — TELEPHONE (OUTPATIENT)
Dept: SCHEDULING | Facility: CLINIC | Age: 78
End: 2023-05-15
Payer: COMMERCIAL

## 2023-05-15 NOTE — TELEPHONE ENCOUNTER
New Patient Appointment FYI    Name of caller: Delvin Lewis     Reason for Visit: AFib    Insurance: [facility]    Insurance ID #: [id number]    Recent Cardiac Test/Procedures: 1/10/23    Referred by: Luis Felipe Calvillo MD    Primary Care Physician: Reji Baker MD     Previous Cardiologist name and phone number: Luis Felipe Calvillo MD    Best contact number: [phone]     Additional notes/History: NPV scheduled 12/14 at 1220PM.

## 2023-06-13 ENCOUNTER — FOLLOW UP (OUTPATIENT)
Dept: URBAN - METROPOLITAN AREA CLINIC 11 | Facility: CLINIC | Age: 78
End: 2023-06-13

## 2023-06-13 DIAGNOSIS — H35.413: ICD-10-CM

## 2023-06-13 DIAGNOSIS — H34.8120: ICD-10-CM

## 2023-06-13 DIAGNOSIS — H43.813: ICD-10-CM

## 2023-06-13 PROCEDURE — P EYLEA PFS

## 2023-06-13 PROCEDURE — 92014 COMPRE OPH EXAM EST PT 1/>: CPT | Mod: 25

## 2023-06-13 PROCEDURE — 92134 CPTRZ OPH DX IMG PST SGM RTA: CPT

## 2023-06-13 PROCEDURE — 67028 INJECTION EYE DRUG: CPT

## 2023-06-13 ASSESSMENT — VISUAL ACUITY: OS_SC: 20/60-1

## 2023-06-13 ASSESSMENT — TONOMETRY: OS_IOP_MMHG: 14

## 2023-06-20 ENCOUNTER — OFFICE VISIT (OUTPATIENT)
Dept: CARDIOLOGY | Facility: CLINIC | Age: 78
End: 2023-06-20
Payer: MEDICARE

## 2023-06-20 VITALS
HEIGHT: 77 IN | OXYGEN SATURATION: 97 % | BODY MASS INDEX: 27.16 KG/M2 | RESPIRATION RATE: 18 BRPM | HEART RATE: 79 BPM | WEIGHT: 230 LBS | DIASTOLIC BLOOD PRESSURE: 84 MMHG | SYSTOLIC BLOOD PRESSURE: 122 MMHG

## 2023-06-20 DIAGNOSIS — I48.91 ATRIAL FIBRILLATION, UNSPECIFIED TYPE (CMS/HCC): Primary | ICD-10-CM

## 2023-06-20 DIAGNOSIS — I10 PRIMARY HYPERTENSION: ICD-10-CM

## 2023-06-20 DIAGNOSIS — Z82.49 FAMILY HISTORY OF CORONARY ARTERY DISEASE: ICD-10-CM

## 2023-06-20 DIAGNOSIS — I42.0 DILATED CARDIOMYOPATHY (CMS/HCC): ICD-10-CM

## 2023-06-20 DIAGNOSIS — I49.5 TACHYCARDIA-BRADYCARDIA (CMS/HCC): ICD-10-CM

## 2023-06-20 PROCEDURE — G8752 SYS BP LESS 140: HCPCS | Performed by: INTERNAL MEDICINE

## 2023-06-20 PROCEDURE — G8754 DIAS BP LESS 90: HCPCS | Performed by: INTERNAL MEDICINE

## 2023-06-20 PROCEDURE — 93000 ELECTROCARDIOGRAM COMPLETE: CPT | Performed by: INTERNAL MEDICINE

## 2023-06-20 PROCEDURE — 99213 OFFICE O/P EST LOW 20 MIN: CPT | Performed by: INTERNAL MEDICINE

## 2023-06-20 RX ORDER — ENALAPRIL MALEATE 10 MG/1
10 TABLET ORAL 2 TIMES DAILY
Qty: 180 TABLET | Refills: 3 | Status: SHIPPED | OUTPATIENT
Start: 2023-06-20 | End: 2023-12-14 | Stop reason: SDUPTHER

## 2023-06-20 RX ORDER — METOPROLOL SUCCINATE 100 MG/1
100 TABLET, EXTENDED RELEASE ORAL 2 TIMES DAILY
Qty: 180 TABLET | Refills: 3 | Status: SHIPPED | OUTPATIENT
Start: 2023-06-20 | End: 2023-12-14 | Stop reason: SDUPTHER

## 2023-06-20 NOTE — PROGRESS NOTES
June 25, 2023      Patient: Delvin Lewis   YOB: 1945   Date of Visit 6/20/2023   [facility]# [id number]   MRN# [id number]       REPORT TYPE: Office Letter    DATE OF SERVICE: 06/20/2023    Reji Baker MD    Dear Dr. Baker:    Delvin Lewis was in our cardiology outpatient offices today for a routine appointment.  He continues to do well from our perspective.  He has been able to pursue all of his normal activities including spending some time in [place] with his family and enjoys that very much.  His blood pressure has been much better controlled recently and he denied palpitations, shortness of breath, or chest discomfort.  His last echocardiogram was in 2021 and was a stress study that did not reveal evidence of ischemia, significant left ventricular dysfunction, or structural disease.  His medicines have not changed significantly and include METOPROLOL, ENALAPRIL, AMLODIPINE and APIXABAN.  He told me that his laboratory tests there have been stable and there were no other medical or cardiovascular issues.  I did review a set of labs that were drawn in March that for the most part were unremarkable.  His CBC was within normal limits as were his liver tests and routine chemistries.    On physical examination, blood pressure was 122/84 with a heart rate of 65 beats per minute.  His rhythm was irregular.  Respiratory rate was 18 and he weighed 230 pounds, about the same as last time.  His lung fields were clear.  He did not have jugular venous distention or carotid bruits.  The cardiac examination revealed a nondisplaced point of maximal impulse with normal heart tones and I did not hear any murmurs.  He had no abdominal findings nor did he have any peripheral edema. Electrocardiogram showed atrial fibrillation in a fine pattern with a controlled ventricular response, and poor R-wave progression that we have seen previously.    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  His next visit with our practice should occur in approximately 6 months.  He has expressed an interest in seeing Dr. Ubaldo Pfeiffer, one of the distinguished members of my group, at the [facility].  Dr. Pfeiffer is copied on this correspondence and will have full access to all of Dr. Lewis's medical records.  I will also be available to answer questions or to address any issues during this important transfer of care.    I spent approximately 30 minutes with Dr. Lewis's case, taking a history, performing a physical examination, interpreting his electrocardiogram, going through his medication list, answering a number of questions regarding his current situation and prognosis, making a recommendation regarding follow-up with Dr. Pfeiffer, and documenting all of the above.    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  I am sure he will do quite well under your care and with Dr. Pfeiffer.  I wish both of you all success in his continuing management.    Best regards,      Luis Felipe Calvillo MD    CC:UBALDO PFEIFFER MD    DD: 06/20/2023 13:22  DT: 06/20/2023 14:37  Voice ID: [id number]/Report ID: [id number]

## 2023-07-18 ENCOUNTER — FOLLOW UP (OUTPATIENT)
Dept: URBAN - METROPOLITAN AREA CLINIC 11 | Facility: CLINIC | Age: 78
End: 2023-07-18

## 2023-07-18 DIAGNOSIS — H34.8120: ICD-10-CM

## 2023-07-18 DIAGNOSIS — H35.413: ICD-10-CM

## 2023-07-18 DIAGNOSIS — H43.813: ICD-10-CM

## 2023-07-18 PROCEDURE — 67028 INJECTION EYE DRUG: CPT

## 2023-07-18 PROCEDURE — P EYLEA PFS

## 2023-07-18 PROCEDURE — 92134 CPTRZ OPH DX IMG PST SGM RTA: CPT

## 2023-07-18 PROCEDURE — 92012 INTRM OPH EXAM EST PATIENT: CPT | Mod: 25

## 2023-07-18 ASSESSMENT — TONOMETRY: OS_IOP_MMHG: 12

## 2023-07-18 ASSESSMENT — VISUAL ACUITY: OS_SC: 20/50-2

## 2023-08-29 ENCOUNTER — FOLLOW UP (OUTPATIENT)
Dept: URBAN - METROPOLITAN AREA CLINIC 11 | Facility: CLINIC | Age: 78
End: 2023-08-29

## 2023-08-29 DIAGNOSIS — H43.813: ICD-10-CM

## 2023-08-29 DIAGNOSIS — H34.8120: ICD-10-CM

## 2023-08-29 DIAGNOSIS — H35.413: ICD-10-CM

## 2023-08-29 PROCEDURE — 67028 INJECTION EYE DRUG: CPT

## 2023-08-29 PROCEDURE — P EYLEA PFS

## 2023-08-29 PROCEDURE — 92134 CPTRZ OPH DX IMG PST SGM RTA: CPT

## 2023-08-29 PROCEDURE — 92012 INTRM OPH EXAM EST PATIENT: CPT | Mod: 25

## 2023-08-29 ASSESSMENT — VISUAL ACUITY: OS_SC: 20/60

## 2023-08-29 ASSESSMENT — TONOMETRY: OS_IOP_MMHG: 11

## 2023-10-03 ENCOUNTER — FOLLOW UP (OUTPATIENT)
Dept: URBAN - METROPOLITAN AREA CLINIC 11 | Facility: CLINIC | Age: 78
End: 2023-10-03

## 2023-10-03 DIAGNOSIS — H35.413: ICD-10-CM

## 2023-10-03 DIAGNOSIS — H43.813: ICD-10-CM

## 2023-10-03 DIAGNOSIS — H34.8120: ICD-10-CM

## 2023-10-03 PROCEDURE — 92134 CPTRZ OPH DX IMG PST SGM RTA: CPT

## 2023-10-03 PROCEDURE — P EYLEA PFS

## 2023-10-03 PROCEDURE — 67028 INJECTION EYE DRUG: CPT

## 2023-10-03 PROCEDURE — 92014 COMPRE OPH EXAM EST PT 1/>: CPT | Mod: 25

## 2023-10-03 ASSESSMENT — VISUAL ACUITY: OS_SC: 20/100+2

## 2023-10-03 ASSESSMENT — TONOMETRY: OS_IOP_MMHG: 13

## 2023-11-02 ENCOUNTER — FOLLOW UP (OUTPATIENT)
Dept: URBAN - METROPOLITAN AREA CLINIC 11 | Facility: CLINIC | Age: 78
End: 2023-11-02

## 2023-11-02 DIAGNOSIS — H34.8120: ICD-10-CM

## 2023-11-02 DIAGNOSIS — H43.813: ICD-10-CM

## 2023-11-02 DIAGNOSIS — H35.413: ICD-10-CM

## 2023-11-02 PROCEDURE — 67028 INJECTION EYE DRUG: CPT

## 2023-11-02 PROCEDURE — 92134 CPTRZ OPH DX IMG PST SGM RTA: CPT

## 2023-11-02 PROCEDURE — P EYLEA PFS

## 2023-11-02 PROCEDURE — 92014 COMPRE OPH EXAM EST PT 1/>: CPT | Mod: 25

## 2023-11-02 ASSESSMENT — VISUAL ACUITY: OS_SC: 20/400

## 2023-11-02 ASSESSMENT — TONOMETRY: OS_IOP_MMHG: 14

## 2023-12-12 ENCOUNTER — FOLLOW UP (OUTPATIENT)
Dept: URBAN - METROPOLITAN AREA CLINIC 11 | Facility: CLINIC | Age: 78
End: 2023-12-12

## 2023-12-12 DIAGNOSIS — H35.413: ICD-10-CM

## 2023-12-12 DIAGNOSIS — H34.8120: ICD-10-CM

## 2023-12-12 DIAGNOSIS — H43.813: ICD-10-CM

## 2023-12-12 PROCEDURE — 92134 CPTRZ OPH DX IMG PST SGM RTA: CPT

## 2023-12-12 PROCEDURE — 67028 INJECTION EYE DRUG: CPT

## 2023-12-12 PROCEDURE — 92014 COMPRE OPH EXAM EST PT 1/>: CPT | Mod: 25

## 2023-12-12 PROCEDURE — P EYLEA PFS

## 2023-12-12 ASSESSMENT — TONOMETRY: OS_IOP_MMHG: 7

## 2023-12-12 ASSESSMENT — VISUAL ACUITY: OS_SC: 20/100-2

## 2023-12-14 ENCOUNTER — OFFICE VISIT (OUTPATIENT)
Dept: CARDIOLOGY | Facility: CLINIC | Age: 78
End: 2023-12-14
Payer: MEDICARE

## 2023-12-14 VITALS
WEIGHT: 236 LBS | DIASTOLIC BLOOD PRESSURE: 80 MMHG | HEIGHT: 77 IN | HEART RATE: 77 BPM | RESPIRATION RATE: 16 BRPM | BODY MASS INDEX: 27.87 KG/M2 | SYSTOLIC BLOOD PRESSURE: 130 MMHG

## 2023-12-14 DIAGNOSIS — I48.91 ATRIAL FIBRILLATION, UNSPECIFIED TYPE (CMS/HCC): Primary | ICD-10-CM

## 2023-12-14 PROCEDURE — 99214 OFFICE O/P EST MOD 30 MIN: CPT | Performed by: INTERNAL MEDICINE

## 2023-12-14 PROCEDURE — G8754 DIAS BP LESS 90: HCPCS | Performed by: INTERNAL MEDICINE

## 2023-12-14 PROCEDURE — 93000 ELECTROCARDIOGRAM COMPLETE: CPT | Performed by: INTERNAL MEDICINE

## 2023-12-14 PROCEDURE — G8752 SYS BP LESS 140: HCPCS | Performed by: INTERNAL MEDICINE

## 2023-12-14 RX ORDER — METOPROLOL SUCCINATE 100 MG/1
100 TABLET, EXTENDED RELEASE ORAL 2 TIMES DAILY
Qty: 180 TABLET | Refills: 3 | Status: SHIPPED | OUTPATIENT
Start: 2023-12-14 | End: 2024-07-24 | Stop reason: SDUPTHER

## 2023-12-14 RX ORDER — ENALAPRIL MALEATE 10 MG/1
10 TABLET ORAL 2 TIMES DAILY
Qty: 180 TABLET | Refills: 3 | Status: SHIPPED | OUTPATIENT
Start: 2023-12-14 | End: 2024-07-24 | Stop reason: SDUPTHER

## 2023-12-14 RX ORDER — AMLODIPINE BESYLATE 5 MG/1
5 TABLET ORAL DAILY
Qty: 90 TABLET | Refills: 1 | Status: SHIPPED | OUTPATIENT
Start: 2023-12-14 | End: 2024-07-24 | Stop reason: SDUPTHER

## 2023-12-14 NOTE — PROGRESS NOTES
Electrophysiology         Reason for visit: permanent atrial fibrillation  Referred by : Dr Calvillo     History of Present Illness:  Patient is a 78-year-old man with past medical history of hypertension, prostate cancer, and permanent atrial fibrillation with [id number] of 3 on Eliquis 5 mg twice daily who presents to our office for management of atrial fibrillation.    He is a former patient of Dr. Calvillo.  Reviewing the records, he has been in permanent atrial fibrillation at least since 2018.  He was first diagnosed with atrial flutter in 1993. There has not been any attempt to restore and maintain sinus rhythm.  He is asymptomatic from cardiovascular standpoint.  Denies chest pain, shortness of breath, lightness or dizziness.  He is physically active and he denies any limitation in exertion.    A comprehensive 15-point review of systems was performed and was negative unless specifically mentioned in the History of Present Illness.        Past Medical History:   Diagnosis Date   • Abnormal ECG    • Arrhythmia    • COVID-19 February   • Prostate cancer (CMS/HCC)        Past Surgical History:   Procedure Laterality Date   • HERNIA REPAIR     • RADIOFREQUENCY ABLATION     • SKIN CANCER EXCISION  2019    right ear s/p MOHS procedure       Current Outpatient Medications on File Prior to Visit   Medication Sig Dispense Refill   • cholecalciferol, vitamin D3, 400 unit capsule daily.     • multivitamin (THERAGRAN) tablet Take by mouth once daily. 1 tablet/capsule     • tadalafiL (CIALIS) 20 mg tablet daily as needed.     • abiraterone (ZYTIGA) 500 mg tablet Take  2 tablets once daily       No current facility-administered medications on file prior to visit.       Allergies, Social History and Family History were reviewed and updated in EMR.     Physical Examination:  Vitals:    12/14/23 1209   BP: 130/80   Pulse: 77   Resp: 16     Constitutional: The patient appeared physically well and in no acute  "distress.  Respiratory: Clear to auscultation, no wheezes or rubs.  Cardiovascular: A comprehensive cardiovascular examination was performed. Highlights include normal jugular venous pressure, 2+ carotids, no bruits. The precordium is quiet. Regular rhythm, rate, S1 and S2 normal, no rubs, or gallops were appreciated. Murmurs: No pathologic murmurs appreciated.  Musculoskeletal/ Extremities: No edema, normal peripheral pulses.  Skin: No rashes or lesions apparent.       No results found for: \"WBC\", \"HGB\", \"HCT\", \"PLT\", \"CHOL\", \"TRIG\", \"HDL\", \"LDLCALC\", \"ALT\", \"AST\", \"NA\", \"K\", \"CREATININE\", \"BUN\", \"TSH\", \"INR\"    Cardiac Imaging    ECHOCARDIOGRAM STRESS TEST 07/13/2021    Interpretation Summary  No evidence of ischemia with stress echocardiogram  Patient completed 9 METS of exercise achieved target heart rate  Baseline EKG atrial fibrillation  No EKG changes diagnostic of ischemia with exercise occasional VPCs noted with exercise  Physiologic blood pressure response  Baseline echocardiogram  Moderately dilated left atrium mildly dilated right atrium  Other chamber sizes are normal  No regional wall motion abnormality preserved ejection fraction 60%  Aortic sclerosis  Mild mitral regurgitation  Mild tricuspid regurgitation estimated pulmonary pressure 30 mmHg  All walls became hyperdynamic with exercise    ECG 12/14/23: AF at 77 BPM with PVC      Assessment and plan:  Patient is a 78-year-old man with past medical history of hypertension, prostate cancer, and permanent atrial fibrillation with [id number] of 3 on Eliquis 5 mg twice daily who presents to our office for management of atrial fibrillation.    #permanent AF   - rate controlled on admission EKG  - continue eliquis 5 mg BD    # hypertension:  - continue norvasc 5 mg daily.     I spent 45 minutes with the patient for record review, examination, care coordination and counseling.     This letter was generated using speech recognition software. Please excuse " any typographical errors.       Ubaldo Pfeiffer MD, [facility]  Cardiac Electrophysiology  [facility]  12/14/2023

## 2023-12-14 NOTE — LETTER
December 14, 2023     Reji Baker MD  [address] [address]    Patient: Delvin Lewis  YOB: 1945  Date of Visit: 12/14/2023      Dear Dr. Baker:    Thank you for referring Delvin Lewis to me for evaluation. Below are my notes for this consultation.    If you have questions, please do not hesitate to call me. I look forward to following your patient along with you.         Sincerely,        Ubaldo Pfeiffer MD        CC: No Recipients    Ubaldo Pfeiffer MD  12/14/2023 12:56 PM  Signed       Electrophysiology         Reason for visit: permanent atrial fibrillation  Referred by : Dr Calvillo     History of Present Illness:  Patient is a 78-year-old man with past medical history of hypertension, prostate cancer, and permanent atrial fibrillation with [id number] of 3 on Eliquis 5 mg twice daily who presents to our office for management of atrial fibrillation.    He is a former patient of Dr. Calvillo.  Reviewing the records, he has been in permanent atrial fibrillation at least since 2018.  He was first diagnosed with atrial flutter in 1993. There has not been any attempt to restore and maintain sinus rhythm.  He is asymptomatic from cardiovascular standpoint.  Denies chest pain, shortness of breath, lightness or dizziness.  He is physically active and he denies any limitation in exertion.    A comprehensive 15-point review of systems was performed and was negative unless specifically mentioned in the History of Present Illness.        Past Medical History:   Diagnosis Date   • Abnormal ECG    • Arrhythmia    • COVID-19 February   • Prostate cancer (CMS/HCC)        Past Surgical History:   Procedure Laterality Date   • HERNIA REPAIR     • RADIOFREQUENCY ABLATION     • SKIN CANCER EXCISION  2019    right ear s/p MOHS procedure       Current Outpatient Medications on File Prior to Visit   Medication Sig Dispense Refill   • cholecalciferol, vitamin D3, 400 unit capsule  "daily.     • multivitamin (THERAGRAN) tablet Take by mouth once daily. 1 tablet/capsule     • tadalafiL (CIALIS) 20 mg tablet daily as needed.     • abiraterone (ZYTIGA) 500 mg tablet Take  2 tablets once daily       No current facility-administered medications on file prior to visit.       Allergies, Social History and Family History were reviewed and updated in EMR.     Physical Examination:  Vitals:    12/14/23 1209   BP: 130/80   Pulse: 77   Resp: 16     Constitutional: The patient appeared physically well and in no acute distress.  Respiratory: Clear to auscultation, no wheezes or rubs.  Cardiovascular: A comprehensive cardiovascular examination was performed. Highlights include normal jugular venous pressure, 2+ carotids, no bruits. The precordium is quiet. Regular rhythm, rate, S1 and S2 normal, no rubs, or gallops were appreciated. Murmurs: No pathologic murmurs appreciated.  Musculoskeletal/ Extremities: No edema, normal peripheral pulses.  Skin: No rashes or lesions apparent.       No results found for: \"WBC\", \"HGB\", \"HCT\", \"PLT\", \"CHOL\", \"TRIG\", \"HDL\", \"LDLCALC\", \"ALT\", \"AST\", \"NA\", \"K\", \"CREATININE\", \"BUN\", \"TSH\", \"INR\"    Cardiac Imaging    ECHOCARDIOGRAM STRESS TEST 07/13/2021    Interpretation Summary  No evidence of ischemia with stress echocardiogram  Patient completed 9 METS of exercise achieved target heart rate  Baseline EKG atrial fibrillation  No EKG changes diagnostic of ischemia with exercise occasional VPCs noted with exercise  Physiologic blood pressure response  Baseline echocardiogram  Moderately dilated left atrium mildly dilated right atrium  Other chamber sizes are normal  No regional wall motion abnormality preserved ejection fraction 60%  Aortic sclerosis  Mild mitral regurgitation  Mild tricuspid regurgitation estimated pulmonary pressure 30 mmHg  All walls became hyperdynamic with exercise    ECG 12/14/23: AF at 77 BPM with PVC      Assessment and plan:  Patient is a 78-year-old " man with past medical history of hypertension, prostate cancer, and permanent atrial fibrillation with [id number] of 3 on Eliquis 5 mg twice daily who presents to our office for management of atrial fibrillation.    #permanent AF   - rate controlled on admission EKG  - continue eliquis 5 mg BD    # hypertension:  - continue norvasc 5 mg daily.     I spent 45 minutes with the patient for record review, examination, care coordination and counseling.     This letter was generated using speech recognition software. Please excuse any typographical errors.       Ubaldo Pfeiffer MD, [facility]  Cardiac Electrophysiology  [facility]  12/14/2023

## 2024-01-23 ENCOUNTER — FOLLOW UP (OUTPATIENT)
Dept: URBAN - METROPOLITAN AREA CLINIC 11 | Facility: CLINIC | Age: 79
End: 2024-01-23

## 2024-01-23 DIAGNOSIS — H35.413: ICD-10-CM

## 2024-01-23 DIAGNOSIS — H43.813: ICD-10-CM

## 2024-01-23 DIAGNOSIS — H34.8120: ICD-10-CM

## 2024-01-23 PROCEDURE — 67028 INJECTION EYE DRUG: CPT

## 2024-01-23 PROCEDURE — 92134 CPTRZ OPH DX IMG PST SGM RTA: CPT

## 2024-01-23 PROCEDURE — PFS EYLEA PFS: Mod: JZ

## 2024-01-23 PROCEDURE — 92014 COMPRE OPH EXAM EST PT 1/>: CPT

## 2024-01-23 ASSESSMENT — TONOMETRY: OS_IOP_MMHG: 14

## 2024-01-23 ASSESSMENT — VISUAL ACUITY: OS_SC: 20/400

## 2024-03-05 ENCOUNTER — FOLLOW UP (OUTPATIENT)
Dept: URBAN - METROPOLITAN AREA CLINIC 11 | Facility: CLINIC | Age: 79
End: 2024-03-05

## 2024-03-05 DIAGNOSIS — H43.813: ICD-10-CM

## 2024-03-05 DIAGNOSIS — H35.413: ICD-10-CM

## 2024-03-05 DIAGNOSIS — H34.8120: ICD-10-CM

## 2024-03-05 PROCEDURE — 92014 COMPRE OPH EXAM EST PT 1/>: CPT

## 2024-03-05 PROCEDURE — 67028 INJECTION EYE DRUG: CPT

## 2024-03-05 PROCEDURE — PFS EYLEA PFS: Mod: JZ

## 2024-03-05 PROCEDURE — 92134 CPTRZ OPH DX IMG PST SGM RTA: CPT

## 2024-03-05 ASSESSMENT — TONOMETRY: OS_IOP_MMHG: 13

## 2024-03-05 ASSESSMENT — VISUAL ACUITY: OS_SC: 20/200

## 2024-04-09 ENCOUNTER — FOLLOW UP (OUTPATIENT)
Dept: URBAN - METROPOLITAN AREA CLINIC 11 | Facility: CLINIC | Age: 79
End: 2024-04-09

## 2024-04-09 DIAGNOSIS — H34.8120: ICD-10-CM

## 2024-04-09 DIAGNOSIS — H35.413: ICD-10-CM

## 2024-04-09 DIAGNOSIS — H43.813: ICD-10-CM

## 2024-04-09 PROCEDURE — 92014 COMPRE OPH EXAM EST PT 1/>: CPT

## 2024-04-09 PROCEDURE — PFS EYLEA PFS: Mod: JZ

## 2024-04-09 PROCEDURE — 67028 INJECTION EYE DRUG: CPT

## 2024-04-09 PROCEDURE — 92134 CPTRZ OPH DX IMG PST SGM RTA: CPT

## 2024-04-09 ASSESSMENT — TONOMETRY: OS_IOP_MMHG: 13

## 2024-04-09 ASSESSMENT — VISUAL ACUITY
OS_PH: 20/80-1
OS_SC: 20/100

## 2024-05-14 ENCOUNTER — FOLLOW UP (OUTPATIENT)
Dept: URBAN - METROPOLITAN AREA CLINIC 11 | Facility: CLINIC | Age: 79
End: 2024-05-14

## 2024-05-14 DIAGNOSIS — H34.8120: ICD-10-CM

## 2024-05-14 DIAGNOSIS — H35.413: ICD-10-CM

## 2024-05-14 PROCEDURE — 92014 COMPRE OPH EXAM EST PT 1/>: CPT | Mod: 25

## 2024-05-14 PROCEDURE — 67028 INJECTION EYE DRUG: CPT

## 2024-05-14 PROCEDURE — 92134 CPTRZ OPH DX IMG PST SGM RTA: CPT

## 2024-05-14 PROCEDURE — 92202 OPSCPY EXTND ON/MAC DRAW: CPT | Mod: NC

## 2024-05-14 PROCEDURE — PFS EYLEA PFS: Mod: JZ

## 2024-05-14 ASSESSMENT — VISUAL ACUITY
OS_SC: 20/400
OS_PH: 20/200-1

## 2024-05-14 ASSESSMENT — TONOMETRY: OS_IOP_MMHG: 13

## 2024-06-18 ENCOUNTER — FOLLOW UP (OUTPATIENT)
Dept: URBAN - METROPOLITAN AREA CLINIC 11 | Facility: CLINIC | Age: 79
End: 2024-06-18

## 2024-06-18 DIAGNOSIS — H35.413: ICD-10-CM

## 2024-06-18 DIAGNOSIS — H34.8120: ICD-10-CM

## 2024-06-18 PROCEDURE — 92134 CPTRZ OPH DX IMG PST SGM RTA: CPT

## 2024-06-18 PROCEDURE — 92202 OPSCPY EXTND ON/MAC DRAW: CPT | Mod: NC

## 2024-06-18 PROCEDURE — 92014 COMPRE OPH EXAM EST PT 1/>: CPT | Mod: 25

## 2024-06-18 PROCEDURE — 67028 INJECTION EYE DRUG: CPT

## 2024-06-18 PROCEDURE — PFS EYLEA PFS: Mod: JZ

## 2024-06-18 ASSESSMENT — TONOMETRY: OS_IOP_MMHG: 12

## 2024-06-18 ASSESSMENT — VISUAL ACUITY: OS_SC: 20/70

## 2024-07-23 ENCOUNTER — FOLLOW UP (OUTPATIENT)
Dept: URBAN - METROPOLITAN AREA CLINIC 11 | Facility: CLINIC | Age: 79
End: 2024-07-23

## 2024-07-23 DIAGNOSIS — H34.8120: ICD-10-CM

## 2024-07-23 PROCEDURE — 92134 CPTRZ OPH DX IMG PST SGM RTA: CPT

## 2024-07-23 PROCEDURE — 92014 COMPRE OPH EXAM EST PT 1/>: CPT | Mod: 25

## 2024-07-23 PROCEDURE — 92202 OPSCPY EXTND ON/MAC DRAW: CPT | Mod: NC

## 2024-07-23 PROCEDURE — PFS EYLEA PFS: Mod: JZ

## 2024-07-23 PROCEDURE — 67028 INJECTION EYE DRUG: CPT

## 2024-07-23 ASSESSMENT — TONOMETRY: OS_IOP_MMHG: 13

## 2024-07-23 ASSESSMENT — VISUAL ACUITY: OS_SC: 20/200

## 2024-07-24 DIAGNOSIS — I10 PRIMARY HYPERTENSION: ICD-10-CM

## 2024-07-24 DIAGNOSIS — I48.91 ATRIAL FIBRILLATION, UNSPECIFIED TYPE (CMS/HCC): Primary | ICD-10-CM

## 2024-07-24 RX ORDER — METOPROLOL SUCCINATE 100 MG/1
100 TABLET, EXTENDED RELEASE ORAL 2 TIMES DAILY
Qty: 180 TABLET | Refills: 3 | Status: SHIPPED | OUTPATIENT
Start: 2024-07-24 | End: 2024-12-12 | Stop reason: SDUPTHER

## 2024-07-24 RX ORDER — AMLODIPINE BESYLATE 5 MG/1
5 TABLET ORAL DAILY
Qty: 90 TABLET | Refills: 3 | Status: SHIPPED | OUTPATIENT
Start: 2024-07-24

## 2024-07-24 RX ORDER — ENALAPRIL MALEATE 10 MG/1
10 TABLET ORAL 2 TIMES DAILY
Qty: 180 TABLET | Refills: 3 | Status: SHIPPED | OUTPATIENT
Start: 2024-07-24 | End: 2024-12-12 | Stop reason: SDUPTHER

## 2024-08-27 ENCOUNTER — FOLLOW UP (OUTPATIENT)
Dept: URBAN - METROPOLITAN AREA CLINIC 11 | Facility: CLINIC | Age: 79
End: 2024-08-27

## 2024-08-27 DIAGNOSIS — H35.413: ICD-10-CM

## 2024-08-27 DIAGNOSIS — H34.8120: ICD-10-CM

## 2024-08-27 PROCEDURE — 92201 OPSCPY EXTND RTA DRAW UNI/BI: CPT | Mod: 59

## 2024-08-27 PROCEDURE — 92134 CPTRZ OPH DX IMG PST SGM RTA: CPT

## 2024-08-27 PROCEDURE — 67028 INJECTION EYE DRUG: CPT

## 2024-08-27 PROCEDURE — 92014 COMPRE OPH EXAM EST PT 1/>: CPT | Mod: 25

## 2024-08-27 PROCEDURE — PFS EYLEA PFS: Mod: JZ

## 2024-08-27 ASSESSMENT — TONOMETRY: OS_IOP_MMHG: 12

## 2024-08-27 ASSESSMENT — VISUAL ACUITY: OS_SC: 20/100

## 2024-10-03 ENCOUNTER — FOLLOW UP (OUTPATIENT)
Dept: URBAN - METROPOLITAN AREA CLINIC 11 | Facility: CLINIC | Age: 79
End: 2024-10-03

## 2024-10-03 DIAGNOSIS — H34.8120: ICD-10-CM

## 2024-10-03 PROCEDURE — PFS EYLEA PFS: Mod: JZ

## 2024-10-03 PROCEDURE — 92014 COMPRE OPH EXAM EST PT 1/>: CPT | Mod: 25

## 2024-10-03 PROCEDURE — 92134 CPTRZ OPH DX IMG PST SGM RTA: CPT

## 2024-10-03 PROCEDURE — 92202 OPSCPY EXTND ON/MAC DRAW: CPT | Mod: NC

## 2024-10-03 PROCEDURE — 67028 INJECTION EYE DRUG: CPT

## 2024-10-03 ASSESSMENT — TONOMETRY
OS_IOP_MMHG: 16
OS_IOP_MMHG: 15

## 2024-10-03 ASSESSMENT — VISUAL ACUITY: OS_SC: 20/200-1

## 2024-12-10 NOTE — PROGRESS NOTES
Electrophysiology         Reason for visit: permanent atrial fibrillation  Referred by : Dr Calvillo     History of Present Illness:  Patient is a 78-year-old male former patient of Dr. Calvillo with past medical history of hypertension, prostate cancer, and permanent atrial fibrillation with [id number] of 3 on Eliquis 5 mg twice daily who presents to our office for management of permanent atrial fibrillation since at least 2018.    Patient was last seen in our office on 12.14.23, at which time his AF was rate controlled and he was continued on eliquis 5 mg BID for AF and amlodipine for HTN.    Patient remains asymptomatic from cardiovascular standpoint.  He continues to deny any chest pain, dyspnea, or lightheadedness/dizziness.  He remains physically active and he denies any limitation in exertion. He denies any bleeding side effects from rivaroxaban.     Had hernia surgery In March in [facility] but has since recovered well. He also underwent XRT for PSA that was rising in setting of history of metastatic prostate cx s/p resection. He continues to swim 5 days a week.    Brief ep history:  -aflutter diagnosed in 1993  -permanent atrial fibrillation at least since 2018. No attempts to restore SR  -transitions care to [facility]. Continued on eliquis 5 mg BID for permAF and amlodipine for HTN.    A comprehensive 15-point review of systems was performed and was negative unless specifically mentioned in the History of Present Illness.        Past Medical History:   Diagnosis Date    Abnormal ECG     Arrhythmia     COVID-19     February    Prostate cancer (CMS/HCC)        Past Surgical History   Procedure Laterality Date    Hernia repair      Radiofrequency ablation      Skin cancer excision  2019    right ear s/p MOHS procedure       Current Outpatient Medications on File Prior to Visit   Medication Sig Dispense Refill    abiraterone (ZYTIGA) 500 mg tablet Take  2 tablets once daily      amLODIPine (NORVASC) 5 mg tablet  "Take 1 tablet (5 mg total) by mouth daily. 90 tablet 3    cholecalciferol, vitamin D3, 400 unit capsule daily.      multivitamin (THERAGRAN) tablet Take by mouth once daily. 1 tablet/capsule      tadalafiL (CIALIS) 20 mg tablet daily as needed.       No current facility-administered medications on file prior to visit.       Allergies, Social History and Family History were reviewed and updated in EMR.     Physical Examination:  Vitals:    12/12/24 1040   BP: (!) 140/80   Pulse: 77       Constitutional: The patient appeared physically well and in no acute distress.  Respiratory: Clear to auscultation, no wheezes or rubs.  Cardiovascular: A comprehensive cardiovascular examination was performed. Highlights include normal jugular venous pressure, 2+ carotids, no bruits. The precordium is quiet. Regular rhythm, rate, S1 and S2 normal, no rubs, or gallops were appreciated. Murmurs: No pathologic murmurs appreciated.  Musculoskeletal/ Extremities: No edema, normal peripheral pulses.  Skin: No rashes or lesions apparent.       No results found for: \"WBC\", \"HGB\", \"HCT\", \"PLT\", \"CHOL\", \"TRIG\", \"HDL\", \"LDLCALC\", \"ALT\", \"AST\", \"NA\", \"K\", \"CREATININE\", \"BUN\", \"TSH\", \"INR\"    Cardiac Imaging    ECHOCARDIOGRAM STRESS TEST 07/13/2021    Interpretation Summary  No evidence of ischemia with stress echocardiogram  Patient completed 9 METS of exercise achieved target heart rate  Baseline EKG atrial fibrillation  No EKG changes diagnostic of ischemia with exercise occasional VPCs noted with exercise  Physiologic blood pressure response  Baseline echocardiogram  Moderately dilated left atrium mildly dilated right atrium  Other chamber sizes are normal  No regional wall motion abnormality preserved ejection fraction 60%  Aortic sclerosis  Mild mitral regurgitation  Mild tricuspid regurgitation estimated pulmonary pressure 30 mmHg  All walls became hyperdynamic with exercise    ECG 12/14/23: AF at 77 BPM with PVC   Ecg today: AF, rate " 77bpm, PRWP     Assessment and plan:  Patient is a 78-year-old man with past medical history of hypertension, prostate cancer, and permanent atrial fibrillation with [id number] of 3 on Eliquis 5 mg twice daily who presents to our office for management of permanent atrial fibrillation.    Permanent AF   - remains rate controlled on resting ECG  - continue apixaban 5 mg BID, denies any bleeding side effects    2. HTN  Well-controlled on current regimen. Was 140/80 this morning in the echo lab which is reasonable in setting of white coat HTN. He continues to check BP at home and keeps a log which has also demonstrated normal values.   - continue amlodipine 5 mg daily.     I spent 35 minutes with the patient for record review, examination, care coordination and counseling.     This letter was generated using speech recognition software. Please excuse any typographical errors.       Ubaldo Pfeiffer MD, [facility]  Cardiac Electrophysiology  [facility]  12/12/2024     I attest that this visit supports the complexity inherent to evaluation and management associated with medical care services that serve as the continuing focal point for all needed health care services and/or medical care services that are part of ongoing care related to this patient’s single, serious, or complex condition.

## 2024-12-12 ENCOUNTER — HOSPITAL ENCOUNTER (OUTPATIENT)
Dept: CARDIOLOGY | Facility: HOSPITAL | Age: 79
Discharge: HOME | End: 2024-12-12
Attending: INTERNAL MEDICINE
Payer: MEDICARE

## 2024-12-12 ENCOUNTER — OFFICE VISIT (OUTPATIENT)
Dept: CARDIOLOGY | Facility: CLINIC | Age: 79
End: 2024-12-12
Payer: MEDICARE

## 2024-12-12 VITALS
WEIGHT: 210 LBS | HEART RATE: 77 BPM | SYSTOLIC BLOOD PRESSURE: 140 MMHG | DIASTOLIC BLOOD PRESSURE: 80 MMHG | BODY MASS INDEX: 24.79 KG/M2 | HEIGHT: 77 IN

## 2024-12-12 VITALS
WEIGHT: 236 LBS | DIASTOLIC BLOOD PRESSURE: 72 MMHG | SYSTOLIC BLOOD PRESSURE: 160 MMHG | HEIGHT: 77 IN | BODY MASS INDEX: 27.87 KG/M2

## 2024-12-12 DIAGNOSIS — I10 PRIMARY HYPERTENSION: ICD-10-CM

## 2024-12-12 DIAGNOSIS — I48.91 ATRIAL FIBRILLATION, UNSPECIFIED TYPE (CMS/HCC): Primary | ICD-10-CM

## 2024-12-12 DIAGNOSIS — I48.91 ATRIAL FIBRILLATION, UNSPECIFIED TYPE (CMS/HCC): ICD-10-CM

## 2024-12-12 LAB
AORTIC ROOT ANNULUS - M-MODE: 3.8 CM
ASCENDING AORTA: 3.5 CM
BSA FOR ECHO PROCEDURE: 2.41 M2
CUSP SEPARATION: 2.2 CM
DOP CALC LVOT STROKE VOLUME: 102.16 CM3
E WAVE DECELERATION TIME: 170 MS
EDV (BP): 153 CM3
EF (A4C): 59.2 %
EF A2C: 58.5 %
EJECTION FRACTION: 59.5 %
ESV (BP): 61.9 CM3
LA ESV (BP): 162 CM3
LA ESV INDEX (A2C): 69.29 CM3/M2
LA ESV INDEX (BP): 67.22 CM3/M2
LAAS-AP2: 42.3 CM2
LAAS-AP4: 41 CM2
LALD A4C: 8.68 CM
LALD A4C: 8.88 CM
LAV-S: 167 CM3
LEFT ATRIUM VOLUME INDEX: 64.32 CM3/M2
LEFT ATRIUM VOLUME: 155 CM3
LEFT VENTRICLE DIASTOLIC VOLUME INDEX: 58.51 CM3/M2
LEFT VENTRICLE DIASTOLIC VOLUME: 141 CM3
LEFT VENTRICLE SYSTOLIC VOLUME INDEX: 23.86 CM3/M2
LEFT VENTRICLE SYSTOLIC VOLUME: 57.5 CM3
LV DIASTOLIC VOLUME: 155 CM3
LV ESV (APICAL 2 CHAMBER): 64.4 CM3
LVAD-AP2: 40.3 CM2
LVAD-AP4: 38 CM2
LVAS-AP2: 23.6 CM2
LVAS-AP4: 22.3 CM2
LVEDVI(A2C): 64.32 CM3/M2
LVEDVI(BP): 63.49 CM3/M2
LVESVI(A2C): 26.72 CM3/M2
LVESVI(BP): 25.68 CM3/M2
LVLD-AP2: 8.87 CM
LVLD-AP4: 8.25 CM
LVLS-AP2: 7.49 CM
LVLS-AP4: 7.17 CM
LVOT 2D: 2.3 CM
LVOT A: 4.15 CM2
LVOT MG: 3 MMHG
LVOT MV: 0.77 M/S
LVOT PEAK VELOCITY: 1.15 M/S
LVOT PG: 5 MMHG
LVOT STROKE VOLUME INDEX: 42.39 ML/M2
LVOT VTI: 24.6 CM
MV PEAK E VEL: 1.05 M/S
MV STENOSIS PRESSURE HALF TIME: 50 MS
MV VALVE AREA P 1/2 METHOD: 4.4 CM2
QRS DURATION: 92
QT INTERVAL: 404
QTC CALCULATION(BAZETT): 457
R AXIS: -24
RVOT VMAX: 0.75 M/S
RVOT VTI: 18.1 CM
SEPTAL TISSUE DOPPLER FREE WALL LATE DIA VELOCITY (APICAL 4 CHAMBER VIEW): 0.15 M/S
T WAVE AXIS: 30
TAPSE: 1.32 CM
VENTRICULAR RATE: 77

## 2024-12-12 PROCEDURE — G8754 DIAS BP LESS 90: HCPCS | Performed by: INTERNAL MEDICINE

## 2024-12-12 PROCEDURE — G2211 COMPLEX E/M VISIT ADD ON: HCPCS | Performed by: INTERNAL MEDICINE

## 2024-12-12 PROCEDURE — 93000 ELECTROCARDIOGRAM COMPLETE: CPT | Performed by: INTERNAL MEDICINE

## 2024-12-12 PROCEDURE — 99214 OFFICE O/P EST MOD 30 MIN: CPT | Performed by: INTERNAL MEDICINE

## 2024-12-12 PROCEDURE — G8753 SYS BP > OR = 140: HCPCS | Performed by: INTERNAL MEDICINE

## 2024-12-12 PROCEDURE — 93306 TTE W/DOPPLER COMPLETE: CPT

## 2024-12-12 PROCEDURE — 93306 TTE W/DOPPLER COMPLETE: CPT | Mod: 26 | Performed by: INTERNAL MEDICINE

## 2024-12-12 RX ORDER — ENALAPRIL MALEATE 10 MG/1
10 TABLET ORAL 2 TIMES DAILY
Qty: 180 TABLET | Refills: 3 | Status: SHIPPED | OUTPATIENT
Start: 2024-12-12 | End: 2025-12-07

## 2024-12-12 RX ORDER — METOPROLOL SUCCINATE 100 MG/1
100 TABLET, EXTENDED RELEASE ORAL 2 TIMES DAILY
Qty: 180 TABLET | Refills: 3 | Status: SHIPPED | OUTPATIENT
Start: 2024-12-12 | End: 2025-12-07

## 2024-12-12 NOTE — LETTER
December 12, 2024     Reji Baker MD  [address] [facility] [address]    Patient: Delvin Lewis  YOB: 1945  Date of Visit: 12/12/2024      Dear Dr. Baker:    Thank you for referring Delvin Lewis to me for evaluation. Below are my notes for this consultation.    If you have questions, please do not hesitate to call me. I look forward to following your patient along with you.         Sincerely,        Ubaldo Pfeiffer MD        CC: No Recipients    Ubaldo Pfeiffer MD  12/12/2024 11:24 AM  Sign when Signing Visit       Electrophysiology         Reason for visit: permanent atrial fibrillation  Referred by : Dr Calvillo     History of Present Illness:  Patient is a 78-year-old male former patient of Dr. Calvillo with past medical history of hypertension, prostate cancer, and permanent atrial fibrillation with [id number] of 3 on Eliquis 5 mg twice daily who presents to our office for management of permanent atrial fibrillation since at least 2018.    Patient was last seen in our office on 12.14.23, at which time his AF was rate controlled and he was continued on eliquis 5 mg BID for AF and amlodipine for HTN.    Patient remains asymptomatic from cardiovascular standpoint.  He continues to deny any chest pain, dyspnea, or lightheadedness/dizziness.  He remains physically active and he denies any limitation in exertion. He denies any bleeding side effects from rivaroxaban.     Had hernia surgery In March in [facility] but has since recovered well. He also underwent XRT for PSA that was rising in setting of history of metastatic prostate cx s/p resection. He continues to swim 5 days a week.    Brief ep history:  -aflutter diagnosed in 1993  -permanent atrial fibrillation at least since 2018. No attempts to restore SR  -transitions care to [facility]. Continued on eliquis 5 mg BID for permAF and amlodipine for HTN.    A comprehensive 15-point review of systems was performed and was negative  "unless specifically mentioned in the History of Present Illness.        Past Medical History:   Diagnosis Date   • Abnormal ECG    • Arrhythmia    • COVID-19 February   • Prostate cancer (CMS/HCC)        Past Surgical History   Procedure Laterality Date   • Hernia repair     • Radiofrequency ablation     • Skin cancer excision  2019    right ear s/p MOHS procedure       Current Outpatient Medications on File Prior to Visit   Medication Sig Dispense Refill   • abiraterone (ZYTIGA) 500 mg tablet Take  2 tablets once daily     • amLODIPine (NORVASC) 5 mg tablet Take 1 tablet (5 mg total) by mouth daily. 90 tablet 3   • cholecalciferol, vitamin D3, 400 unit capsule daily.     • multivitamin (THERAGRAN) tablet Take by mouth once daily. 1 tablet/capsule     • tadalafiL (CIALIS) 20 mg tablet daily as needed.       No current facility-administered medications on file prior to visit.       Allergies, Social History and Family History were reviewed and updated in EMR.     Physical Examination:  Vitals:    12/12/24 1040   BP: (!) 140/80   Pulse: 77       Constitutional: The patient appeared physically well and in no acute distress.  Respiratory: Clear to auscultation, no wheezes or rubs.  Cardiovascular: A comprehensive cardiovascular examination was performed. Highlights include normal jugular venous pressure, 2+ carotids, no bruits. The precordium is quiet. Regular rhythm, rate, S1 and S2 normal, no rubs, or gallops were appreciated. Murmurs: No pathologic murmurs appreciated.  Musculoskeletal/ Extremities: No edema, normal peripheral pulses.  Skin: No rashes or lesions apparent.       No results found for: \"WBC\", \"HGB\", \"HCT\", \"PLT\", \"CHOL\", \"TRIG\", \"HDL\", \"LDLCALC\", \"ALT\", \"AST\", \"NA\", \"K\", \"CREATININE\", \"BUN\", \"TSH\", \"INR\"    Cardiac Imaging    ECHOCARDIOGRAM STRESS TEST 07/13/2021    Interpretation Summary  No evidence of ischemia with stress echocardiogram  Patient completed 9 METS of exercise achieved target heart " rate  Baseline EKG atrial fibrillation  No EKG changes diagnostic of ischemia with exercise occasional VPCs noted with exercise  Physiologic blood pressure response  Baseline echocardiogram  Moderately dilated left atrium mildly dilated right atrium  Other chamber sizes are normal  No regional wall motion abnormality preserved ejection fraction 60%  Aortic sclerosis  Mild mitral regurgitation  Mild tricuspid regurgitation estimated pulmonary pressure 30 mmHg  All walls became hyperdynamic with exercise    ECG 12/14/23: AF at 77 BPM with PVC   Ecg today: AF, rate 77bpm, PRWP     Assessment and plan:  Patient is a 78-year-old man with past medical history of hypertension, prostate cancer, and permanent atrial fibrillation with [id number] of 3 on Eliquis 5 mg twice daily who presents to our office for management of permanent atrial fibrillation.    Permanent AF   - remains rate controlled on resting ECG  - continue apixaban 5 mg BID, denies any bleeding side effects    2. HTN  Well-controlled on current regimen. Was 140/80 this morning in the echo lab which is reasonable in setting of white coat HTN. He continues to check BP at home and keeps a log which has also demonstrated normal values.   - continue amlodipine 5 mg daily.     I spent 35 minutes with the patient for record review, examination, care coordination and counseling.     This letter was generated using speech recognition software. Please excuse any typographical errors.       Ubaldo Pfeiffer MD, [facility]  Cardiac Electrophysiology  [facility]  12/12/2024     I attest that this visit supports the complexity inherent to evaluation and management associated with medical care services that serve as the continuing focal point for all needed health care services and/or medical care services that are part of ongoing care related to this patient’s single, serious, or complex condition.